# Patient Record
Sex: MALE | Race: OTHER | NOT HISPANIC OR LATINO
[De-identification: names, ages, dates, MRNs, and addresses within clinical notes are randomized per-mention and may not be internally consistent; named-entity substitution may affect disease eponyms.]

---

## 2017-02-22 ENCOUNTER — APPOINTMENT (OUTPATIENT)
Dept: OTOLARYNGOLOGY | Facility: CLINIC | Age: 32
End: 2017-02-22

## 2017-02-22 VITALS
RESPIRATION RATE: 18 BRPM | DIASTOLIC BLOOD PRESSURE: 89 MMHG | SYSTOLIC BLOOD PRESSURE: 127 MMHG | HEART RATE: 82 BPM | BODY MASS INDEX: 34.36 KG/M2 | WEIGHT: 240 LBS | HEIGHT: 70 IN

## 2017-02-22 DIAGNOSIS — Z82.49 FAMILY HISTORY OF ISCHEMIC HEART DISEASE AND OTHER DISEASES OF THE CIRCULATORY SYSTEM: ICD-10-CM

## 2017-02-22 DIAGNOSIS — Z09 ENCOUNTER FOR FOLLOW-UP EXAMINATION AFTER COMPLETED TREATMENT FOR CONDITIONS OTHER THAN MALIGNANT NEOPLASM: ICD-10-CM

## 2017-06-09 ENCOUNTER — OUTPATIENT (OUTPATIENT)
Dept: OUTPATIENT SERVICES | Facility: HOSPITAL | Age: 32
LOS: 1 days | End: 2017-06-09

## 2017-06-09 VITALS
TEMPERATURE: 99 F | DIASTOLIC BLOOD PRESSURE: 80 MMHG | HEART RATE: 98 BPM | OXYGEN SATURATION: 98 % | SYSTOLIC BLOOD PRESSURE: 120 MMHG | RESPIRATION RATE: 16 BRPM | HEIGHT: 70 IN | WEIGHT: 240.08 LBS

## 2017-06-09 DIAGNOSIS — K62.89 OTHER SPECIFIED DISEASES OF ANUS AND RECTUM: ICD-10-CM

## 2017-06-09 DIAGNOSIS — J34.2 DEVIATED NASAL SEPTUM: ICD-10-CM

## 2017-06-09 DIAGNOSIS — J30.2 OTHER SEASONAL ALLERGIC RHINITIS: ICD-10-CM

## 2017-06-09 DIAGNOSIS — G47.33 OBSTRUCTIVE SLEEP APNEA (ADULT) (PEDIATRIC): ICD-10-CM

## 2017-06-09 DIAGNOSIS — G43.909 MIGRAINE, UNSPECIFIED, NOT INTRACTABLE, WITHOUT STATUS MIGRAINOSUS: ICD-10-CM

## 2017-06-09 DIAGNOSIS — J45.909 UNSPECIFIED ASTHMA, UNCOMPLICATED: ICD-10-CM

## 2017-06-09 DIAGNOSIS — J34.3 HYPERTROPHY OF NASAL TURBINATES: ICD-10-CM

## 2017-06-09 LAB
BUN SERPL-MCNC: 15 MG/DL — SIGNIFICANT CHANGE UP (ref 7–23)
CALCIUM SERPL-MCNC: 9.4 MG/DL — SIGNIFICANT CHANGE UP (ref 8.4–10.5)
CHLORIDE SERPL-SCNC: 102 MMOL/L — SIGNIFICANT CHANGE UP (ref 98–107)
CO2 SERPL-SCNC: 24 MMOL/L — SIGNIFICANT CHANGE UP (ref 22–31)
CREAT SERPL-MCNC: 1.14 MG/DL — SIGNIFICANT CHANGE UP (ref 0.5–1.3)
GLUCOSE SERPL-MCNC: 86 MG/DL — SIGNIFICANT CHANGE UP (ref 70–99)
HCT VFR BLD CALC: 44 % — SIGNIFICANT CHANGE UP (ref 39–50)
HGB BLD-MCNC: 14.4 G/DL — SIGNIFICANT CHANGE UP (ref 13–17)
MCHC RBC-ENTMCNC: 26.2 PG — LOW (ref 27–34)
MCHC RBC-ENTMCNC: 32.7 % — SIGNIFICANT CHANGE UP (ref 32–36)
MCV RBC AUTO: 80 FL — SIGNIFICANT CHANGE UP (ref 80–100)
PLATELET # BLD AUTO: 264 K/UL — SIGNIFICANT CHANGE UP (ref 150–400)
PMV BLD: 10.9 FL — SIGNIFICANT CHANGE UP (ref 7–13)
POTASSIUM SERPL-MCNC: 3.7 MMOL/L — SIGNIFICANT CHANGE UP (ref 3.5–5.3)
POTASSIUM SERPL-SCNC: 3.7 MMOL/L — SIGNIFICANT CHANGE UP (ref 3.5–5.3)
RBC # BLD: 5.5 M/UL — SIGNIFICANT CHANGE UP (ref 4.2–5.8)
RBC # FLD: 13.7 % — SIGNIFICANT CHANGE UP (ref 10.3–14.5)
SODIUM SERPL-SCNC: 140 MMOL/L — SIGNIFICANT CHANGE UP (ref 135–145)
WBC # BLD: 8.63 K/UL — SIGNIFICANT CHANGE UP (ref 3.8–10.5)
WBC # FLD AUTO: 8.63 K/UL — SIGNIFICANT CHANGE UP (ref 3.8–10.5)

## 2017-06-09 RX ORDER — SODIUM CHLORIDE 9 MG/ML
1000 INJECTION, SOLUTION INTRAVENOUS
Qty: 0 | Refills: 0 | Status: DISCONTINUED | OUTPATIENT
Start: 2017-06-20 | End: 2017-07-05

## 2017-06-09 NOTE — H&P PST ADULT - NEGATIVE MUSCULOSKELETAL SYMPTOMS
no arthralgia/no arthritis/no myalgia/no muscle weakness/no neck pain/no stiffness/no arm pain R/no joint swelling/no back pain/no muscle cramps/no arm pain L

## 2017-06-09 NOTE — H&P PST ADULT - NEGATIVE GENERAL GENITOURINARY SYMPTOMS
no hematuria/no urine discoloration/no flank pain R/normal libido/no incontinence/no nocturia/no urinary hesitancy/no renal colic/normal urinary frequency/no dysuria/no flank pain L/no bladder infections

## 2017-06-09 NOTE — H&P PST ADULT - PMH
Asthma  Denies any intubations or hospitalizations.  Migraine    SG (obstructive sleep apnea)    Proctitis    Seasonal allergies    Sinusitis

## 2017-06-09 NOTE — H&P PST ADULT - NEGATIVE OPHTHALMOLOGIC SYMPTOMS
no pain L/no loss of vision L/no irritation L/no scleral injection L/no scleral injection R/no photophobia/no discharge R/no discharge L/no diplopia/no blurred vision L/no pain R/no lacrimation L/no lacrimation R/no irritation R/no blurred vision R/no loss of vision R

## 2017-06-09 NOTE — H&P PST ADULT - NEGATIVE NEUROLOGICAL SYMPTOMS
no loss of sensation/no vertigo/no generalized seizures/no loss of consciousness/no focal seizures/no syncope/no paresthesias/no tremors/no weakness/no hemiparesis/no difficulty walking/no facial palsy/no transient paralysis/no confusion

## 2017-06-09 NOTE — H&P PST ADULT - NEGATIVE MALE-SPECIFIC SYMPTOMS
no urethral discharge/no undescended testicle R/no scrotal mass L/no scrotal mass R/no undescended testicle L/no erectile dysfunction/no genital sores/no impotence/no ejaculatory dysfunction

## 2017-06-09 NOTE — H&P PST ADULT - ENT GEN HX ROS MEA POS PC
post-nasal discharge/epistaxis/nasal congestion/dry mouth/sinus symptoms/nasal discharge/nasal obstruction/R/T current condition

## 2017-06-09 NOTE — H&P PST ADULT - ENMT COMMENTS
DX: hypertrophy of nasal turbinates, other chronic diseases of tonsils and adenoids, deviated nasal septum.

## 2017-06-09 NOTE — H&P PST ADULT - PROBLEM SELECTOR PLAN 1
pt is scheduled for a septoplasty, turbinectomy, tonsillectomy on 6/20/17. Preop instructions provided, will take own Pepcid, NPO status discussed and NSAID OTC meds to stop on 6/13/17. Verbalized understanding.

## 2017-06-09 NOTE — H&P PST ADULT - NEGATIVE CARDIOVASCULAR SYMPTOMS
no dyspnea on exertion/no palpitations/no claudication/no paroxysmal nocturnal dyspnea/no peripheral edema/no chest pain/no orthopnea

## 2017-06-09 NOTE — H&P PST ADULT - NEGATIVE GENERAL SYMPTOMS
no sweating/no weight gain/no polyphagia/no fatigue/no anorexia/no weight loss/no polyuria/no chills/no fever/no malaise

## 2017-06-09 NOTE — H&P PST ADULT - NEGATIVE ENMT SYMPTOMS
no tinnitus/no hearing difficulty/no abnormal taste sensation/no vertigo/no dysphagia/no ear pain/no recurrent cold sores/no gum bleeding/no throat pain

## 2017-06-09 NOTE — H&P PST ADULT - HISTORY OF PRESENT ILLNESS
32 y/o male with PMH proctitis, migraines, sinusitis, seasonal allergies and asthma (from allergies) Presents to PST with a preop dx of hypertrophy of nasal turbinates, other chronic diseases of tonsils and adenoids, deviated nasal septum and to be evaluated for a scheduled septoplasty, turbinectomy, tonsillectomy on6/20/17. Pt states has been presenting nasal, sinus, headache issues for many years, cant breath well and sleep well due to condition. Takes meds w/o improvement. Pt f/u with ENT, passed away recently so Dr Saul was recommended to pt. Went for further evaluation and a scope and sleep study was ordered, + SG (no cpap) but surgical intervention was recommended at this time for which is now scheduled. 32 y/o male with PMH proctitis, migraines, sinusitis, seasonal allergies and asthma (from allergies) Presents to PST with a preop dx of hypertrophy of nasal turbinates, other chronic diseases of tonsils and adenoids, deviated nasal septum and to be evaluated for a scheduled septoplasty, turbinectomy, tonsillectomy on6/20/17. Pt states has been presenting nasal, sinus, headache issues for many years, cant breath well and sleep well due to condition. Takes meds w/o improvement. Pt f/u with ENT, specialist passed away so recently Dr Saul was recommended to pt. Went for further evaluation and a scope and sleep study was ordered, + SG (no cpap) but surgical intervention was recommended at this time for which is now scheduled.

## 2017-06-09 NOTE — H&P PST ADULT - NEGATIVE PSYCHIATRIC SYMPTOMS
no depression/no visual hallucinations/no mood swings/no auditory hallucinations/no suicidal ideation/no insomnia/no anxiety/no hyperactivity/no paranoia/no memory loss/no agitation

## 2017-06-09 NOTE — H&P PST ADULT - NEGATIVE GASTROINTESTINAL SYMPTOMS
no melena/no abdominal pain/no constipation/no change in bowel habits/no nausea/no flatulence/no vomiting/no diarrhea

## 2017-06-09 NOTE — H&P PST ADULT - RS GEN PE MLT RESP DETAILS PC
no wheezes/no rhonchi/clear to auscultation bilaterally/respirations non-labored/breath sounds equal/airway patent/no rales/good air movement

## 2017-06-09 NOTE — H&P PST ADULT - FAMILY HISTORY
Father  Still living? Yes, Estimated age: Age Unknown  Family history of heart attack, Age at diagnosis: Age Unknown  Family history of hypertension, Age at diagnosis: Age Unknown     Mother  Still living? Yes, Estimated age: Age Unknown  Family history of heart attack, Age at diagnosis: Age Unknown  Family history of hypertension, Age at diagnosis: Age Unknown     Sibling  Still living? Yes, Estimated age: Age Unknown  Family history of hypertension, Age at diagnosis: Age Unknown

## 2017-06-09 NOTE — H&P PST ADULT - NEGATIVE BREAST SYMPTOMS
no breast lump L/no breast tenderness L/no nipple discharge L/no breast tenderness R/no nipple discharge R/no breast lump R

## 2017-06-20 ENCOUNTER — APPOINTMENT (OUTPATIENT)
Dept: OTOLARYNGOLOGY | Facility: HOSPITAL | Age: 32
End: 2017-06-20

## 2017-06-20 ENCOUNTER — RESULT REVIEW (OUTPATIENT)
Age: 32
End: 2017-06-20

## 2017-06-20 ENCOUNTER — OUTPATIENT (OUTPATIENT)
Dept: OUTPATIENT SERVICES | Facility: HOSPITAL | Age: 32
LOS: 1 days | Discharge: HOME | End: 2017-06-20
Payer: MEDICAID

## 2017-06-20 VITALS
RESPIRATION RATE: 16 BRPM | OXYGEN SATURATION: 98 % | TEMPERATURE: 98 F | WEIGHT: 240.08 LBS | HEART RATE: 69 BPM | SYSTOLIC BLOOD PRESSURE: 139 MMHG | HEIGHT: 70 IN | DIASTOLIC BLOOD PRESSURE: 82 MMHG

## 2017-06-20 DIAGNOSIS — J34.2 DEVIATED NASAL SEPTUM: ICD-10-CM

## 2017-06-20 PROCEDURE — 88311 DECALCIFY TISSUE: CPT | Mod: 26

## 2017-06-20 PROCEDURE — 30130 EXCISE INFERIOR TURBINATE: CPT | Mod: GC

## 2017-06-20 PROCEDURE — 88304 TISSUE EXAM BY PATHOLOGIST: CPT | Mod: 26

## 2017-06-20 PROCEDURE — 30520 REPAIR OF NASAL SEPTUM: CPT | Mod: GC

## 2017-06-20 PROCEDURE — 42826 REMOVAL OF TONSILS: CPT

## 2017-06-20 RX ORDER — OXYCODONE HYDROCHLORIDE 5 MG/1
2 TABLET ORAL
Qty: 56 | Refills: 0 | OUTPATIENT
Start: 2017-06-20 | End: 2017-06-27

## 2017-06-20 RX ORDER — BENZOCAINE AND MENTHOL 5; 1 G/100ML; G/100ML
1 LIQUID ORAL
Qty: 0 | Refills: 0 | COMMUNITY
Start: 2017-06-20

## 2017-06-20 RX ORDER — IBUPROFEN 200 MG
1 TABLET ORAL
Qty: 0 | Refills: 0 | COMMUNITY

## 2017-06-20 RX ORDER — DIPHENHYDRAMINE HCL 50 MG
0 CAPSULE ORAL
Qty: 0 | Refills: 0 | COMMUNITY

## 2017-06-20 RX ORDER — ALBUTEROL 90 UG/1
2 AEROSOL, METERED ORAL
Qty: 0 | Refills: 0 | COMMUNITY

## 2017-06-20 RX ORDER — BENZOCAINE AND MENTHOL 5; 1 G/100ML; G/100ML
1 LIQUID ORAL
Qty: 0 | Refills: 0 | Status: DISCONTINUED | OUTPATIENT
Start: 2017-06-20 | End: 2017-07-05

## 2017-06-20 RX ORDER — MESALAMINE 400 MG
1 TABLET, DELAYED RELEASE (ENTERIC COATED) ORAL
Qty: 0 | Refills: 0 | COMMUNITY

## 2017-06-20 RX ORDER — FEXOFENADINE HCL 30 MG
1 TABLET ORAL
Qty: 0 | Refills: 0 | COMMUNITY

## 2017-06-20 RX ORDER — FLUTICASONE PROPIONATE 50 MCG
0 SPRAY, SUSPENSION NASAL
Qty: 0 | Refills: 0 | COMMUNITY

## 2017-06-20 RX ORDER — ONDANSETRON 8 MG/1
4 TABLET, FILM COATED ORAL ONCE
Qty: 0 | Refills: 0 | Status: DISCONTINUED | OUTPATIENT
Start: 2017-06-20 | End: 2017-06-21

## 2017-06-20 RX ORDER — CEPHALEXIN 500 MG
1 CAPSULE ORAL
Qty: 21 | Refills: 0 | OUTPATIENT
Start: 2017-06-20 | End: 2017-06-27

## 2017-06-20 RX ORDER — HYDROMORPHONE HYDROCHLORIDE 2 MG/ML
1 INJECTION INTRAMUSCULAR; INTRAVENOUS; SUBCUTANEOUS
Qty: 0 | Refills: 0 | Status: DISCONTINUED | OUTPATIENT
Start: 2017-06-20 | End: 2017-06-21

## 2017-06-20 RX ORDER — SUMATRIPTAN SUCCINATE 4 MG/.5ML
0 INJECTION, SOLUTION SUBCUTANEOUS
Qty: 0 | Refills: 0 | COMMUNITY

## 2017-06-20 RX ADMIN — HYDROMORPHONE HYDROCHLORIDE 1 MILLIGRAM(S): 2 INJECTION INTRAMUSCULAR; INTRAVENOUS; SUBCUTANEOUS at 15:10

## 2017-06-20 RX ADMIN — HYDROMORPHONE HYDROCHLORIDE 1 MILLIGRAM(S): 2 INJECTION INTRAMUSCULAR; INTRAVENOUS; SUBCUTANEOUS at 15:25

## 2017-06-20 NOTE — ASU DISCHARGE PLAN (ADULT/PEDIATRIC). - NURSING INSTRUCTIONS
Cool and warm liquids that are not irritating to the throat should be given for the first day or two. Avoid hot liquids. Avoid citrus juices and milk. Advance at your own pace starting with soft foods and advancing to a regular diet. Avoid rough and scratchy foods and foods that are difficult to chew for approximately 5 days. Avoid strenous exercise and blowing of nose.

## 2017-06-20 NOTE — ASU DISCHARGE PLAN (ADULT/PEDIATRIC). - MEDICATION SUMMARY - MEDICATIONS TO TAKE
I will START or STAY ON the medications listed below when I get home from the hospital:    Lialda 1.2 g oral delayed release tablet  -- 1 tab(s) by mouth am  -- Indication: For home    Canasa 1000 mg rectal suppository  -- 1 suppository(ies) rectally once a day (at bedtime)  -- Indication: For home    acetaminophen-oxycodone 325 mg-5 mg oral tablet  -- 2 tab(s) by mouth every 6 hours, As needed, Severe Pain (7 - 10) MDD:8 tab  -- Indication: For pain    Imitrex 25 mg oral tablet  --  by mouth , As Needed  -- Indication: For home    Allegra 180 mg oral tablet  -- 1 tab(s) by mouth once a day  -- Indication: For home    Ventolin HFA 90 mcg/inh inhalation aerosol  -- 2 puff(s) inhaled 4 times a day, As Needed  -- Indication: For home    Keflex 500 mg oral capsule  -- 1 cap(s) by mouth every 8 hours  -- Finish all this medication unless otherwise directed by prescriber.    -- Indication: For antibiotics    Pepcid Complete oral tablet, chewable  -- 1 tab(s) by mouth every 12 hours, As Needed  -- Indication: For home    benzocaine-menthol 15 mg-3.6 mg mucous membrane lozenge  -- 1 lozenge mucous membrane every 3 hours, As Needed  -- Indication: For pain

## 2017-06-20 NOTE — ASU DISCHARGE PLAN (ADULT/PEDIATRIC). - ITEMS TO FOLLOWUP WITH YOUR PHYSICIAN'S
follow up in clinic tomorrow for removal of merocel nasal packing Dinh nasal packing removed in PACU by ENT

## 2017-06-20 NOTE — ASU DISCHARGE PLAN (ADULT/PEDIATRIC). - NOTIFY
Fever greater than 101/Persistent Nausea and Vomiting/Bleeding that does not stop/Inability to Tolerate Liquids or Foods/Pain not relieved by Medications Persistent Nausea and Vomiting/Unable to Urinate/Fever greater than 101/Swelling that continues/Bleeding that does not stop/Inability to Tolerate Liquids or Foods/Pain not relieved by Medications

## 2017-06-20 NOTE — ASU DISCHARGE PLAN (ADULT/PEDIATRIC). - MEDICATION SUMMARY - MEDICATIONS TO STOP TAKING
I will STOP taking the medications listed below when I get home from the hospital:    Flonase 50 mcg/inh nasal spray  --  into nose 2 times a day    Advil Liquigel 200 mg oral capsule  -- 1 cap(s) by mouth every 6 hours, As Needed  last dose 6/13/17    Benadryl 25 mg oral tablet  --  by mouth , As Needed

## 2017-06-20 NOTE — PROGRESS NOTE ADULT - SUBJECTIVE AND OBJECTIVE BOX
Pt. fully recovered from Anesthesia.  No anesthesia complications.  AOX3, VSS.  Transfer care to Otolaryngology.  Pt. to remain in PACU overnight for SG precautions.

## 2017-06-21 ENCOUNTER — OTHER (OUTPATIENT)
Age: 32
End: 2017-06-21

## 2017-06-21 ENCOUNTER — TRANSCRIPTION ENCOUNTER (OUTPATIENT)
Age: 32
End: 2017-06-21

## 2017-06-21 ENCOUNTER — APPOINTMENT (OUTPATIENT)
Dept: OTOLARYNGOLOGY | Facility: CLINIC | Age: 32
End: 2017-06-21

## 2017-06-21 VITALS
HEART RATE: 80 BPM | DIASTOLIC BLOOD PRESSURE: 91 MMHG | TEMPERATURE: 98 F | OXYGEN SATURATION: 99 % | RESPIRATION RATE: 16 BRPM | SYSTOLIC BLOOD PRESSURE: 148 MMHG

## 2017-06-27 LAB — SURGICAL PATHOLOGY STUDY: SIGNIFICANT CHANGE UP

## 2017-06-28 ENCOUNTER — APPOINTMENT (OUTPATIENT)
Dept: OTOLARYNGOLOGY | Facility: CLINIC | Age: 32
End: 2017-06-28

## 2017-09-15 ENCOUNTER — APPOINTMENT (OUTPATIENT)
Dept: OTOLARYNGOLOGY | Facility: CLINIC | Age: 32
End: 2017-09-15
Payer: MEDICAID

## 2017-09-15 VITALS
SYSTOLIC BLOOD PRESSURE: 144 MMHG | WEIGHT: 240 LBS | BODY MASS INDEX: 34.36 KG/M2 | HEIGHT: 70 IN | HEART RATE: 76 BPM | DIASTOLIC BLOOD PRESSURE: 92 MMHG

## 2017-09-15 DIAGNOSIS — R06.89 OTHER ABNORMALITIES OF BREATHING: ICD-10-CM

## 2017-09-15 DIAGNOSIS — J35.8 OTHER CHRONIC DISEASES OF TONSILS AND ADENOIDS: ICD-10-CM

## 2017-09-15 PROCEDURE — 31231 NASAL ENDOSCOPY DX: CPT | Mod: 58,59

## 2017-09-15 PROCEDURE — 99024 POSTOP FOLLOW-UP VISIT: CPT

## 2017-09-15 PROCEDURE — 31575 DIAGNOSTIC LARYNGOSCOPY: CPT | Mod: 58

## 2017-09-15 RX ORDER — OXYCODONE AND ACETAMINOPHEN 5; 325 MG/1; MG/1
5-325 TABLET ORAL
Qty: 14 | Refills: 0 | Status: DISCONTINUED | COMMUNITY
Start: 2017-06-28 | End: 2017-09-15

## 2017-09-22 ENCOUNTER — CLINICAL ADVICE (OUTPATIENT)
Age: 32
End: 2017-09-22

## 2017-10-18 ENCOUNTER — APPOINTMENT (OUTPATIENT)
Dept: OTOLARYNGOLOGY | Facility: CLINIC | Age: 32
End: 2017-10-18

## 2017-11-14 ENCOUNTER — APPOINTMENT (OUTPATIENT)
Dept: SLEEP CENTER | Facility: CLINIC | Age: 32
End: 2017-11-14
Payer: MEDICAID

## 2017-11-14 PROCEDURE — G0399: CPT | Mod: 26

## 2017-11-15 ENCOUNTER — OUTPATIENT (OUTPATIENT)
Dept: OUTPATIENT SERVICES | Facility: HOSPITAL | Age: 32
LOS: 1 days | End: 2017-11-15
Payer: MEDICAID

## 2017-11-15 PROCEDURE — G0399: CPT

## 2017-11-17 ENCOUNTER — RESULT REVIEW (OUTPATIENT)
Age: 32
End: 2017-11-17

## 2017-11-20 DIAGNOSIS — G47.33 OBSTRUCTIVE SLEEP APNEA (ADULT) (PEDIATRIC): ICD-10-CM

## 2017-12-07 ENCOUNTER — RESULT REVIEW (OUTPATIENT)
Age: 32
End: 2017-12-07

## 2017-12-08 ENCOUNTER — RESULT REVIEW (OUTPATIENT)
Age: 32
End: 2017-12-08

## 2018-05-31 ENCOUNTER — APPOINTMENT (OUTPATIENT)
Dept: OTOLARYNGOLOGY | Facility: CLINIC | Age: 33
End: 2018-05-31
Payer: MEDICAID

## 2018-05-31 VITALS
WEIGHT: 235 LBS | HEART RATE: 95 BPM | HEIGHT: 70 IN | BODY MASS INDEX: 33.64 KG/M2 | DIASTOLIC BLOOD PRESSURE: 90 MMHG | SYSTOLIC BLOOD PRESSURE: 134 MMHG

## 2018-05-31 DIAGNOSIS — Z88.9 ALLERGY STATUS TO UNSPECIFIED DRUGS, MEDICAMENTS AND BIOLOGICAL SUBSTANCES: ICD-10-CM

## 2018-05-31 DIAGNOSIS — G47.9 SLEEP DISORDER, UNSPECIFIED: ICD-10-CM

## 2018-05-31 DIAGNOSIS — Z87.19 PERSONAL HISTORY OF OTHER DISEASES OF THE DIGESTIVE SYSTEM: ICD-10-CM

## 2018-05-31 PROCEDURE — 31231 NASAL ENDOSCOPY DX: CPT

## 2018-05-31 PROCEDURE — 99214 OFFICE O/P EST MOD 30 MIN: CPT | Mod: 25

## 2018-07-16 PROBLEM — J45.909 UNSPECIFIED ASTHMA, UNCOMPLICATED: Chronic | Status: ACTIVE | Noted: 2017-06-09

## 2019-09-02 PROBLEM — Z09 FOLLOW UP: Status: ACTIVE | Noted: 2017-02-22

## 2020-01-01 NOTE — H&P PST ADULT - GASTROINTESTINAL DETAILS
Any formula type is fine and since it is so soon I recommend just going ahead and giving it to him when needed as he is only 5 weeks and will likely take anything.     soft/no masses palpable/no organomegaly/no rebound tenderness/nontender/no guarding/no distention/no bruit/no rigidity/bowel sounds normal

## 2020-05-09 NOTE — ASU PREOP CHECKLIST - LATEX ALLERGY
Reason for Call:  Medication or medication refill:    Do you use a [facility]?  Name of the pharmacy and phone number for the current request:  [facility]-please call pharmacy for approval     Name of the medication requested: traMADol (ULTRAM) 50 MG tablet     Other request: patient also states he lost his prescription for an wrist/arm bracelet. Patient would like another prescription for the wrist/arm supplies order DEM.     Can we leave a detailed message on this number? YES    Phone number patient can be reached at: Cell number on file:    Telephone Information:   Mobile [phone]       Best Time: anytime     Call taken on 5/9/2020 at 10:53 AM by Smitha Hendricks       no

## 2022-11-14 ENCOUNTER — RESULT REVIEW (OUTPATIENT)
Age: 37
End: 2022-11-14

## 2023-04-05 PROBLEM — G47.33 OBSTRUCTIVE SLEEP APNEA (ADULT) (PEDIATRIC): Chronic | Status: ACTIVE | Noted: 2017-06-09

## 2023-04-05 PROBLEM — G43.909 MIGRAINE, UNSPECIFIED, NOT INTRACTABLE, WITHOUT STATUS MIGRAINOSUS: Chronic | Status: ACTIVE | Noted: 2017-06-09

## 2023-04-05 PROBLEM — J32.9 CHRONIC SINUSITIS, UNSPECIFIED: Chronic | Status: ACTIVE | Noted: 2017-06-09

## 2023-04-05 PROBLEM — K62.89 OTHER SPECIFIED DISEASES OF ANUS AND RECTUM: Chronic | Status: ACTIVE | Noted: 2017-06-09

## 2023-04-05 PROBLEM — J30.2 OTHER SEASONAL ALLERGIC RHINITIS: Chronic | Status: ACTIVE | Noted: 2017-06-09

## 2023-04-24 ENCOUNTER — APPOINTMENT (OUTPATIENT)
Dept: OTOLARYNGOLOGY | Facility: CLINIC | Age: 38
End: 2023-04-24

## 2023-04-27 ENCOUNTER — NON-APPOINTMENT (OUTPATIENT)
Age: 38
End: 2023-04-27

## 2023-04-27 ENCOUNTER — APPOINTMENT (OUTPATIENT)
Dept: OTOLARYNGOLOGY | Facility: CLINIC | Age: 38
End: 2023-04-27
Payer: MEDICAID

## 2023-04-27 VITALS
SYSTOLIC BLOOD PRESSURE: 150 MMHG | WEIGHT: 260 LBS | HEIGHT: 70 IN | DIASTOLIC BLOOD PRESSURE: 99 MMHG | TEMPERATURE: 97.9 F | BODY MASS INDEX: 37.22 KG/M2 | HEART RATE: 75 BPM

## 2023-04-27 DIAGNOSIS — J31.0 CHRONIC RHINITIS: ICD-10-CM

## 2023-04-27 DIAGNOSIS — Z01.10 ENCOUNTER FOR EXAMINATION OF EARS AND HEARING W/OUT ABNORMAL FINDINGS: ICD-10-CM

## 2023-04-27 DIAGNOSIS — J34.2 DEVIATED NASAL SEPTUM: ICD-10-CM

## 2023-04-27 PROCEDURE — 92567 TYMPANOMETRY: CPT

## 2023-04-27 PROCEDURE — 99203 OFFICE O/P NEW LOW 30 MIN: CPT | Mod: 25

## 2023-04-27 PROCEDURE — 31231 NASAL ENDOSCOPY DX: CPT

## 2023-04-27 PROCEDURE — 92557 COMPREHENSIVE HEARING TEST: CPT

## 2023-04-27 NOTE — PROCEDURE
[FreeTextEntry6] : Anterior Rhinoscopy insufficient to account for symptoms.\par \par After informed verbal consent is obtained, the fiberoptic nasal endoscope #9 is passed via the right nasal cavity.\par The following anatomic sites were directly examined in a sequential fashion:\par The scope was introduced in both  nasal passages between the middle and inferior turbinates to exam the inferior portion of the middle meatus and the fontanelle, as well as the maxillary ostia.  Next, the scope was passed medially and posteriorly to the middle turbinates to examine the sphenoethmoid recess and the superior turbinate region.\par  \par \par   There is [ 0 ]% obstruction of the nasopharynx with adenoid tissue.\par \par NO deviated nasal septum was noted causing obstruction.\par The turbinates were congested-bilateral.\par \par Right Side:\par ·               Mucosa: wnl\par ·               Mucous: none\par ·               Polyp: none\par ·               Inferior Turbinate: sl congested\par ·               Middle Turbinate:sl congested\par ·               Superior Turbinate: wnl\par ·               Inferior Meatus:clear\par ·               Middle Meatus: clear\par ·               Super Meatus: clear\par ·               Sphenoethmoidal Recess: wnl\par \par \par \par Left Side:\par ·               Mucosa: wnl\par ·               Mucous:none\par ·               Polyp: none\par ·               Inferior Turbinate: sl congested\par ·               Middle Turbinate: sl congested\par ·               Superior Turbinate:wnl\par ·               Inferior Meatus: clear\par ·               Middle Meatus: clear\par ·               Super Meatus:clear\par ·               Sphenoethmoidal Recess: wnl\par \par

## 2023-04-27 NOTE — END OF VISIT
[FreeTextEntry3] : I personally saw and examined IRENE VALE in detail. I spoke to ANNABEL Velasquez regarding the assessment and plan of care. I reviewed the above assessment and plan of care, and agree. I have made changes in changes in the body of the note where appropriate.I personally reviewed the HPI, PMH, FH, SH, ROS and medications/allergies. I have spoken to ANNABEL Velasquez regarding the history and have personally determined the assessment and plan of care, and documented this myself. I was present and participated in all key portions of the encounter and all procedures noted above. I have made changes in the body of the note where appropriate.\par \par Attesting Faculty: See Attending Signature Below \par \par \par

## 2023-04-27 NOTE — ASSESSMENT
[FreeTextEntry1] : Mr. VALE 37 year M w/ hx of Septo/turb years ago complains of clogged ears with  muffled hearing s/p flight in February.  Complains of dry mouth x 2 years. Currently sick with a Cold taking Medrol dose pack \par \par Bilateral ETD:\par -Hearing Test performed to evaluate the extent of hearing loss and to explain pt's symptoms-type C tymp Right ear\par \par Dry mouth / GERD/Rhinitis:\par -Antireflux recommendations were given to the patient\par -Maalox and omeprazole prescribed\par -A formal GI evaluation may be needed and was discussed with the patient.\par \par Nasal congestion:\par -Rx: Hypertonic saline \par finish rx of medrol dospak\par poss f/u Dr Saul\par \par f/u prn

## 2023-04-27 NOTE — HISTORY OF PRESENT ILLNESS
[Sinus] : sinus surgery [T & A] : tonsillectomy & adenoidectomy [de-identified] : 36 yo male\par PAtient with hx of septoplasty/turb/tonsil for SG years ago complains of bilateral clogged ears s/p flight in February. His ears popped after the flight but still feels clogged and his hearing is muffled. He also complains that over the past 2 years his has really bad dry mouth, he thought it was from his mouth guard he stopped wearing it. He is currently sick with a cold, taking Medrol dose pack.  \par Chr nasal congestion and mouth breathing\par h/o GERD [Hearing Loss] : no hearing loss [Otalgia] : otalgia [Eustachian Tube Dysfunction] : eustachian tube dysfunction [Cholesteatoma] : no cholesteatoma [Loud Noise Exposure] : no history of loud noise exposure [Early Onset Hearing Loss] : no early onset hearing loss [Headache] : headache [Nasal Congestion] : nasal congestion [Ear Fullness] : ear fullness [Neck Mass] : no neck mass [Cough] : no cough

## 2023-07-05 ENCOUNTER — APPOINTMENT (OUTPATIENT)
Dept: OTOLARYNGOLOGY | Facility: CLINIC | Age: 38
End: 2023-07-05
Payer: MEDICAID

## 2023-07-05 VITALS
DIASTOLIC BLOOD PRESSURE: 109 MMHG | SYSTOLIC BLOOD PRESSURE: 162 MMHG | WEIGHT: 255 LBS | HEART RATE: 80 BPM | HEIGHT: 70 IN | BODY MASS INDEX: 36.51 KG/M2

## 2023-07-05 DIAGNOSIS — H69.83 OTHER SPECIFIED DISORDERS OF EUSTACHIAN TUBE, BILATERAL: ICD-10-CM

## 2023-07-05 DIAGNOSIS — Z87.898 PERSONAL HISTORY OF OTHER SPECIFIED CONDITIONS: ICD-10-CM

## 2023-07-05 DIAGNOSIS — K11.7 DISTURBANCES OF SALIVARY SECRETION: ICD-10-CM

## 2023-07-05 PROCEDURE — 92567 TYMPANOMETRY: CPT

## 2023-07-05 PROCEDURE — 92557 COMPREHENSIVE HEARING TEST: CPT

## 2023-07-05 PROCEDURE — 31231 NASAL ENDOSCOPY DX: CPT

## 2023-07-05 PROCEDURE — 99214 OFFICE O/P EST MOD 30 MIN: CPT | Mod: 25

## 2023-07-05 RX ORDER — GUAIFENESIN 600 MG/1
600 TABLET, EXTENDED RELEASE ORAL
Qty: 120 | Refills: 4 | Status: ACTIVE | COMMUNITY
Start: 2023-07-05 | End: 1900-01-01

## 2023-07-05 RX ORDER — ERENUMAB-AOOE 70 MG/ML
INJECTION SUBCUTANEOUS
Refills: 0 | Status: ACTIVE | COMMUNITY

## 2023-07-05 RX ORDER — SUMATRIPTAN 50 MG/1
TABLET, FILM COATED ORAL
Refills: 0 | Status: ACTIVE | COMMUNITY

## 2023-07-05 RX ORDER — METHYLPREDNISOLONE 4 MG/1
4 TABLET ORAL
Qty: 21 | Refills: 0 | Status: COMPLETED | COMMUNITY
Start: 2018-05-31 | End: 2023-07-05

## 2023-07-05 RX ORDER — RIMEGEPANT SULFATE 75 MG/75MG
TABLET, ORALLY DISINTEGRATING ORAL
Refills: 0 | Status: ACTIVE | COMMUNITY

## 2023-07-05 RX ORDER — MESALAMINE 375 MG/1
0.38 CAPSULE, EXTENDED RELEASE ORAL
Refills: 0 | Status: COMPLETED | COMMUNITY
End: 2023-07-05

## 2023-07-05 RX ORDER — EUCALYPTUS/PEPPERMINT OIL
SOLUTION, NON-ORAL NASAL
Qty: 2 | Refills: 5 | Status: COMPLETED | COMMUNITY
Start: 2017-09-15 | End: 2023-07-05

## 2023-07-05 RX ORDER — FLUTICASONE PROPIONATE 50 UG/1
50 SPRAY, METERED NASAL DAILY
Qty: 1 | Refills: 5 | Status: ACTIVE | COMMUNITY
Start: 2023-07-05 | End: 1900-01-01

## 2023-07-05 RX ORDER — MESALAMINE 1.2 G/1
TABLET, DELAYED RELEASE ORAL
Refills: 0 | Status: ACTIVE | COMMUNITY

## 2023-07-05 NOTE — REASON FOR VISIT
[Initial Evaluation] : an initial evaluation for [FreeTextEntry2] : former patient of Dr. Saul, here for dry mouth

## 2023-07-05 NOTE — PHYSICAL EXAM
[Nasal Endoscopy Performed] : nasal endoscopy was performed, see procedure section for findings [Removed] : palatine tonsils previously removed [Laryngoscopy Performed] : laryngoscopy was performed, see procedure section for findings [Normal] : no rashes [FreeTextEntry1] : dry mouth, popped ears [de-identified] : thick [de-identified] : thick Base of Tongue

## 2023-07-05 NOTE — PROCEDURE
[Image(s) Captured] : image(s) captured and filed [Video Captured] : video captured and filed [Topical Lidocaine] : topical lidocaine [Oxymetazoline HCl] : oxymetazoline HCl [Flexible Endoscope] : examined with the flexible endoscope [Serial Number: ___] : Serial Number: [unfilled] [Recalcitrant Symptoms] : recalcitrant symptoms  [Anterior rhinoscopy insufficient to account for symptoms] : anterior rhinoscopy insufficient to account for symptoms [Normal] : the paranasal sinuses had no abnormalities [FreeTextEntry6] : Pre-op indication(s): dry mouth\par Post-op indication(s): septum midline, eustachian tubes are open, no adenoids\par Verbal consent obtained from patient.\par “Anterior rhinoscopy insufficient to account for symptoms” \par Details for procedure: \par Scope #: 201\par Type of scope:    flexible fiber optic telescope  X   Rigid glass telescope \par Anesthesia and/or vasoconstriction was achieved topically by using: \par 4% Lidocaine spray   0.05% Oxymetazoline     Other ______ \par The following anatomic sites were directly examined in a sequential fashion: \par The scope was introduced in the nasal passage between the middle and inferior turbinates to exam the inferior portion of the middle meatus and the fontanelle, as well as the maxillary ostia. Next, the scope was passed medially and posteriorly to the middle turbinates to examine the sphenoethmoid recess and the superior turbinate region. \par Upon visualization the finders are as follows: \par Nasal Septum:   Normal  \par Bleeding site cauterized:    Anterior   left   right   Posterior   left   right \par Method:   Silver Nitrate   YAG Laser    Electrocautery ______ \par Right Side: \par * Mucosa: Normal\par * Mucous: Normal\par * Polyp: Normal\par * Inferior Turbinate: Normal\par * Middle Turbinate: Normal\par * Superior Turbinate: Normal\par * Inferior Meatus: Normal\par * Middle Meatus: Normal\par * Super Meatus: Normal\par * Sphenoethmoidal Recess: Normal\par Left Side: \par * Mucosa: Normal\par * Mucous: Normal\par * Polyp: Normal\par * Inferior Turbinate: Normal\par * Middle Turbinate: Normal\par * Superior Turbinate: Normal\par * Inferior Meatus: Normal\par * Middle Meatus: Normal\par * Super Meatus: Normal\par * Sphenoethmoidal Recess: Normal\par The patient tolerated the procedure well without any complications.\par \par

## 2023-07-05 NOTE — CONSULT LETTER
[Dear  ___] : Dear  [unfilled], [Courtesy Letter:] : I had the pleasure of seeing your patient, [unfilled], in my office today. [Please see my note below.] : Please see my note below. [Sincerely,] : Sincerely, [FreeTextEntry2] : Sandor Gordon [FreeTextEntry3] : Joselo Saul MD, FRAN, FACS\par  Department Otolaryngology\par Director of [facility]\par Professor of Otolaryngology, \par [employer]\par

## 2023-07-05 NOTE — HISTORY OF PRESENT ILLNESS
[de-identified] : 37 year old male former patient of Dr. Saul, here for dry mouth.  [facility] for the past 3 summers, and last winter has been having a very dry mouth.   [facility] uses a lot of throat lozenges. [facility] has been having a lot of migraine headaches.  States a couple of months ago he flew and his ears bothered him for a couple days after flying.  [facility] saw Dr. Yu and was told it was a sinus issue and he should see Dr. Saul. \par S/p Septoplasty, turbinectomy, and tonsillectomy, subcapsular with coblation 6/20/2017

## 2023-07-11 ENCOUNTER — APPOINTMENT (OUTPATIENT)
Dept: CARDIOLOGY | Facility: CLINIC | Age: 38
End: 2023-07-11
Payer: MEDICAID

## 2023-07-11 ENCOUNTER — NON-APPOINTMENT (OUTPATIENT)
Age: 38
End: 2023-07-11

## 2023-07-11 VITALS
DIASTOLIC BLOOD PRESSURE: 105 MMHG | HEART RATE: 75 BPM | WEIGHT: 255 LBS | HEIGHT: 70 IN | OXYGEN SATURATION: 98 % | SYSTOLIC BLOOD PRESSURE: 158 MMHG | BODY MASS INDEX: 36.51 KG/M2

## 2023-07-11 PROCEDURE — 93000 ELECTROCARDIOGRAM COMPLETE: CPT

## 2023-07-11 PROCEDURE — 99204 OFFICE O/P NEW MOD 45 MIN: CPT | Mod: 25

## 2023-07-13 NOTE — ASSESSMENT
[FreeTextEntry1] : Patient has had progressive hypertension is overweight and is not careful with salt in his diet.  He claims his blood test with Dr. Gordno were in order.  He has no diabetes and is a non-smoker.\par \par Clearly he has significant hypertension which is not controlled and he is on no medication.\par EKG and physical exam are unremarkable except for the hypertension and his abdominal obesity

## 2023-07-13 NOTE — ADDENDUM
[FreeTextEntry1] : Patient called post visit to ask a few questions.  He related that he has a diagnosis of obstructive sleep apnea and uses a device dental device.  He has not been using it for some time.\par \par I did tell him that obstructive sleep apnea and hypertension are very much related\par I suggested a formal evaluation by pulmonary sleep physician of his obstructive sleep apnea and the appropriate therapy which might require CPAP rather than a dental device\par \par The patient has started losartan\par He has a follow-up in 1 month\par I gave him the name of some sleep physicians\par Onur rueda MD

## 2023-07-13 NOTE — REASON FOR VISIT
[FreeTextEntry1] : Ellen Jackson 37 years old here for evaluation of hypertension.  The patient was noted by his internist to be hypertensive and on a recent visit to otolaryngology he again had significant elevations of his systolic and diastolic pressure.

## 2023-07-13 NOTE — PHYSICAL EXAM
[Normal Conjunctiva] : normal conjunctiva [No Carotid Bruit] : no carotid bruit [Normal S1, S2] : normal S1, S2 [Clear Lung Fields] : clear lung fields [Good Air Entry] : good air entry [Soft] : abdomen soft [Non Tender] : non-tender [Normal Gait] : normal gait [No Edema] : no edema [Normal DP B/L] : normal DP B/L [de-identified] : Overweight elevated BMI no acute distress [de-identified] : S1-S2 no murmur even with provocative maneuvers [de-identified] : Protuberant abdomen

## 2023-07-13 NOTE — DISCUSSION/SUMMARY
[EKG obtained to assist in diagnosis and management of assessed problem(s)] : EKG obtained to assist in diagnosis and management of assessed problem(s) [FreeTextEntry1] : 1.  I discussed the importance of weight reduction and salt restriction.  I discussed multiple foods that are high in salt and foods that are caloric\par \par 2.  I suggested that he see a nutritionist and discuss this with his internist\par \par 3.  I am concerned about his blood pressure and I have started him on losartan 50 mg a day.  He will make great efforts to reduce his salt decrease his food intake and begin to exercise\par \par 4.  He will follow-up again in 1 month

## 2023-07-13 NOTE — HISTORY OF PRESENT ILLNESS
[FreeTextEntry1] : Ellen Jackson is 37 years old with a history of significant migraines without an aura on multiple medications including Aimiovig and Nurtec.  He has a history of xerostomia dry mouth.  He also has a history of some inflammatory bowel disease on Lialda\par \par The patient recently has been noted to be hypertensive and his blood pressure seems to be elevating even further.  He is overweight and is not careful with the salt in his diet.  He does not exercise\par \par He has no history of diabetes and is a non-smoker but has a very strong family history of coronary disease and hypertrophic cardiomyopathy\par \par On several visits his blood pressure has risen above 160/100 occasionally up to 180/100.  He denies symptoms of chest pain chest pressure shortness of breath.\par \par He has had no significant hospitalizations\par \par EKG July 11, 2023 normal sinus rhythm within normal limits no evidence of LVH

## 2023-07-20 ENCOUNTER — APPOINTMENT (OUTPATIENT)
Dept: PULMONOLOGY | Facility: CLINIC | Age: 38
End: 2023-07-20
Payer: MEDICAID

## 2023-07-20 PROCEDURE — 99204 OFFICE O/P NEW MOD 45 MIN: CPT | Mod: 95

## 2023-07-20 NOTE — HISTORY OF PRESENT ILLNESS
[Home] : at home, [unfilled] , at the time of the visit. [Medical Office: ([facility])___] : at the medical office located in  [Verbal consent obtained from patient] : the patient, [unfilled] [Frequent Nocturnal Awakening] : frequent nocturnal awakening [Awakes Unrefreshed] : awakening unrefreshed [Awakes with Headache] : headache upon awakening [Awakening With Dry Mouth] : awakening with dry mouth [Recent  Weight Gain] : recent weight gain [Daytime Somnolence] : daytime somnolence [To Bed: ___] : ~he/she~ goes to bed at [unfilled] [Arises: ___] : arises at [unfilled] [Sleep Onset Latency: ___ minutes] : sleep onset latency of [unfilled] minutes reported [Nocturnal Awakenings: ___] : ~he/she~ typically has [unfilled] nocturnal awakenings [WASO: ___] : Wake time after sleep onset is [unfilled] [TST: ___] : Total sleep time is [unfilled] [Daytime Sleep: ___] : daytime sleep: [unfilled] [FreeTextEntry1] : 37 year old male here for evaluation and management of obstructive sleep apnea.\par \par Patient has a history of septoplasty, turbinectomy, tonsillectomy (6/20/2017)\par \par Patient reports a longstanding history of migraines and obstructive sleep apnea.  Recently his migraines have gotten worse and also was diagnosed with hypertension.  He is concerned that this may be related to suboptimally treated obstructive sleep apnea so he he is here for further management.\par \par PSG (12/2/2007) AHI 1.2/hr\par AIDA HST (5/23/2013) 5/hr\par AIDA HST (11/24 - 11/27/2015) 5/hr T90 1.6%\par HST (9/27/2016) AHI 1.4/hr, T90 0.1%\par HST (11/15/2017) with oral appliance CARITO 2.6/hr, T90 0.0%\par HST (11/16/2017) without oral appliance CARITO 4.6/hr, T90 0.3%\par \par Patient has never tried CPAP before.  He was advised advised against CPAP therapy from his prior physicians given his mild severity.  On his latest HST performed in 2017 without the oral appliance flow limited breathing was observed throughout the study.  He is increasingly symptomatic and has gained about 15 pounds since the last sleep study.  Other sleep-related symptoms and sleep schedule are as listed below. [Snoring] : no snoring [Witnessed Apneas] : no witnessed sleep apnea [Unintentional Sleep while Active] : no unintentional sleep while active [Unintentional Sleep While Inactive] : no unintentional sleep while inactive [ESS] : 3

## 2023-07-20 NOTE — ASSESSMENT
[FreeTextEntry1] : This is a 37 year old male here for further management of obstructive sleep apnea. The patient has a history of mild sleep apnea he diagnosed on home sleep studies.  Since the latest sleep study which showed a normal CARITO, he has gained a significant amount of weight and has worsening signs and symptoms of sleep-disordered breathing. He was referred to the [facility] for a 2 night diagnostic HSAT with and without oral appliance.  He was advised to avoid using the dental device for 2 nights leading up to the sleep study the ramifications of SG and its potential therapeutic modalities were discussed with the patient.  He is willing to consider CPAP if he is found to have obstructive sleep apnea.  He will follow up with us after the HSAT.

## 2023-07-21 ENCOUNTER — APPOINTMENT (OUTPATIENT)
Dept: OTOLARYNGOLOGY | Facility: CLINIC | Age: 38
End: 2023-07-21

## 2023-07-30 NOTE — PATIENT PROFILE ADULT. - PRO SERVICES AT [facility]
For information on Fall & Injury Prevention, visit: [email or web address] OR  [email or web address] OR  https://www.cdc.gov/steadi/patient.html none

## 2023-08-15 ENCOUNTER — TRANSCRIPTION ENCOUNTER (OUTPATIENT)
Age: 38
End: 2023-08-15

## 2023-08-16 ENCOUNTER — OUTPATIENT (OUTPATIENT)
Dept: OUTPATIENT SERVICES | Facility: HOSPITAL | Age: 38
LOS: 1 days | End: 2023-08-16
Payer: MEDICAID

## 2023-08-16 ENCOUNTER — APPOINTMENT (OUTPATIENT)
Dept: SLEEP CENTER | Facility: CLINIC | Age: 38
End: 2023-08-16
Payer: MEDICAID

## 2023-08-16 ENCOUNTER — APPOINTMENT (OUTPATIENT)
Dept: CARDIOLOGY | Facility: CLINIC | Age: 38
End: 2023-08-16
Payer: MEDICAID

## 2023-08-16 VITALS
HEART RATE: 105 BPM | BODY MASS INDEX: 36.02 KG/M2 | DIASTOLIC BLOOD PRESSURE: 93 MMHG | WEIGHT: 251 LBS | SYSTOLIC BLOOD PRESSURE: 146 MMHG | OXYGEN SATURATION: 99 %

## 2023-08-16 DIAGNOSIS — G43.909 MIGRAINE, UNSPECIFIED, NOT INTRACTABLE, W/OUT STATUS MIGRAINOSUS: ICD-10-CM

## 2023-08-16 PROCEDURE — 95800 SLP STDY UNATTENDED: CPT | Mod: 26

## 2023-08-16 PROCEDURE — 99213 OFFICE O/P EST LOW 20 MIN: CPT

## 2023-08-16 PROCEDURE — 95800 SLP STDY UNATTENDED: CPT

## 2023-08-16 NOTE — ASSESSMENT
[FreeTextEntry1] : Patient has had progressive hypertension is overweight and is not careful with salt in his diet.  He claims his blood test with Dr. Gordon were in order.  He has no diabetes and is a non-smoker.  Clearly he has significant hypertension which is not controlled and he is on no medication. EKG and physical exam are unremarkable except for the hypertension and his abdominal obesity  Patient on losartan 50 mg a day has improvement in his blood pressure as well as his decreasing his salt.  He is also lost a few pounds but is not on a clear-cut diet  He is being reevaluated for obstructive sleep apnea

## 2023-08-16 NOTE — DISCUSSION/SUMMARY
[FreeTextEntry1] : 1.  Discussed the importance of continuing with a low-salt, seeing a nutritionist for better control of his weight. 2.  I am increasing his losartan to 75 mg a day 50 at night and 20 5 in the morning 3.  Follow-up again in 2 months, 2D echo at that visit  Prescription sent to [facility] pharmacy

## 2023-08-16 NOTE — PHYSICAL EXAM
HPI:  75 yo M with no pertinent medical history (but without any physician visits for years), initially brought into [facility] ED with dyspnea with flu-like symptoms that had been worsening for 5 days. He was found to be in hypercapneic respiratory failure and was intubated in the ED after having persistent dyspnea and increasing work of breathing even after a short trial of BiPAP. He was admitted to the MICU where he was continued on brochodilators and systemic steroids. RVP was positive for RVP, which was attributed as the trigger for his respiratory distress. He has been going through CPAP trials daily without much improvement of his lung mechanics and we are consulted for his possible obstructive lung disease exacerbation management.    All other components of the 10 point review of systems is negative aside from those mentioned above.    PAST MEDICAL & SURGICAL HISTORY:  No pertinent past medical history  No significant past surgical history    Smoking history:  [ ] Current [X ] Former [ ] Never      VITAL SIGNS:  ICU Vital Signs Last 24 Hrs  T(C): 37.3 (13 Jan 2020 13:00), Max: 37.4 (13 Jan 2020 09:19)  T(F): 99.2 (13 Jan 2020 13:00), Max: 99.3 (13 Jan 2020 09:19)  HR: 66 (13 Jan 2020 13:00) (46 - 102)  BP: 109/70 (13 Jan 2020 13:00) (90/50 - 198/74)  BP(mean): 95 (13 Jan 2020 13:00) (59 - 118)  ABP: --  ABP(mean): --  RR: 24 (13 Jan 2020 13:00) (11 - 33)  SpO2: 100% (13 Jan 2020 13:00) (79% - 100%)    Mode: AC/ CMV (Assist Control/ Continuous Mandatory Ventilation), RR (machine): 12, TV (machine): 400, FiO2: 40, PEEP: 5, ITime: 0.8, MAP: 10, PIP: 39    01-12 @ 07:01  -  01-13 @ 07:00  --------------------------------------------------------  IN: 2232 mL / OUT: 1355 mL / NET: 877 mL    01-13 @ 07:01 - 01-13 @ 14:19  --------------------------------------------------------  IN: 563.9 mL / OUT: 225 mL / NET: 338.9 mL      CAPILLARY BLOOD GLUCOSE      POCT Blood Glucose.: 113 mg/dL (13 Jan 2020 12:07)      PHYSICAL EXAM:  Constitutional: Intubated and sedated  HEENT: NC/AT; PERRL, anicteric sclera; no oropharyngeal erythema or exudates; slightly dry mucus membranes  Neck: supple, no JVD  Respiratory:    Cardiovascular: +S1/S2, RRR  Gastrointestinal: abdomen soft, NT/ND; +BS x4  Extremities: WWP; no clubbing, cyanosis or edema  Vascular: 2+ radial, DP/PT and femoral pulses B/L      MEDICATIONS:  MEDICATIONS  (STANDING):  albuterol/ipratropium for Nebulization 3 milliLiter(s) Nebulizer every 4 hours  azithromycin  IVPB 500 milliGRAM(s) IV Intermittent every 24 hours  chlorhexidine 0.12% Liquid 15 milliLiter(s) Oral Mucosa every 12 hours  chlorhexidine 2% Cloths 1 Application(s) Topical daily  dexMEDEtomidine Infusion 0.2 MICROgram(s)/kG/Hr (2.5 mL/Hr) IV Continuous <Continuous>  dextrose 5%. 1000 milliLiter(s) (50 mL/Hr) IV Continuous <Continuous>  dextrose 50% Injectable 12.5 Gram(s) IV Push once  dextrose 50% Injectable 25 Gram(s) IV Push once  dextrose 50% Injectable 25 Gram(s) IV Push once  enoxaparin Injectable 30 milliGRAM(s) SubCutaneous every 24 hours  insulin lispro (HumaLOG) corrective regimen sliding scale   SubCutaneous every 6 hours  methylPREDNISolone sodium succinate Injectable 40 milliGRAM(s) IV Push daily  metoclopramide 5 milliGRAM(s) Oral every 8 hours  pantoprazole  Injectable 40 milliGRAM(s) IV Push daily  petrolatum Ophthalmic Ointment 1 Application(s) Both EYES three times a day  polyethylene glycol 3350 17 Gram(s) Oral daily  propofol Infusion 5 MICROgram(s)/kG/Min (1.5 mL/Hr) IV Continuous <Continuous>    MEDICATIONS  (PRN):  acetaminophen  Suppository .. 650 milliGRAM(s) Rectal every 6 hours PRN Temp greater or equal to 38C (100.4F)  dextrose 40% Gel 15 Gram(s) Oral once PRN Blood Glucose LESS THAN 70 milliGRAM(s)/deciliter  glucagon  Injectable 1 milliGRAM(s) IntraMuscular once PRN Glucose LESS THAN 70 milligrams/deciliter      ALLERGIES:  Allergies    No Known Allergies    Intolerances        LABS:                        10.6   7.40  )-----------( 157      ( 13 Jan 2020 06:34 )             33.9     01-13    142  |  100  |  18  ----------------------------<  95  3.9   |  32<H>  |  0.85    Ca    8.5      13 Jan 2020 06:34  Phos  2.9     01-13  Mg     2.6     01-13 [Normal Conjunctiva] : normal conjunctiva [No Carotid Bruit] : no carotid bruit [Normal S1, S2] : normal S1, S2 [Clear Lung Fields] : clear lung fields [Good Air Entry] : good air entry [Soft] : abdomen soft [Non Tender] : non-tender [No Edema] : no edema [Normal DP B/L] : normal DP B/L [de-identified] : Overweight elevated BMI no acute distress [de-identified] : S1-S2 no murmur even with provocative maneuvers [de-identified] : Protuberant abdomen

## 2023-08-16 NOTE — HISTORY OF PRESENT ILLNESS
[FreeTextEntry1] : Ellen Jackson is 37 years old with a history of significant migraines without an aura on multiple medications including Aimiovig and Nurtec.  He has a history of xerostomia dry mouth.  He also has a history of some inflammatory bowel disease on Lialda  The patient recently has been noted to be hypertensive and his blood pressure seems to be elevating even further.  He is overweight and is not careful with the salt in his diet.  He does not exercise  He has no history of diabetes and is a non-smoker but has a very strong family history of coronary disease and hypertrophic cardiomyopathy  On several visits his blood pressure has risen above 160/100 occasionally up to 180/100.  He denies symptoms of chest pain chest pressure shortness of breath.  He has had no significant hospitalizations  EKG July 11, 2023 normal sinus rhythm within normal limits no evidence of LVH  Visit August 16, 2023: I placed the patient on losartan 50 mg a day on last visit.  He has decreased his salt since last visit and has lost about 3 pounds though is not on any specific diet and has not seen a nutritionist.  Today he has a headache/migraine.  He is being evaluated by obstructive sleep apnea and is to have testing off his device and on the device which is a dental device.

## 2023-08-16 NOTE — REASON FOR VISIT
[FreeTextEntry1] : Ellen Jackson 37 years old here for follow-up of his hypertension and elevated BMI.  I last saw him 1 month ago

## 2023-08-24 DIAGNOSIS — G47.33 OBSTRUCTIVE SLEEP APNEA (ADULT) (PEDIATRIC): ICD-10-CM

## 2023-08-25 ENCOUNTER — NON-APPOINTMENT (OUTPATIENT)
Age: 38
End: 2023-08-25

## 2023-09-05 ENCOUNTER — APPOINTMENT (OUTPATIENT)
Dept: PULMONOLOGY | Facility: CLINIC | Age: 38
End: 2023-09-05

## 2023-09-26 ENCOUNTER — APPOINTMENT (OUTPATIENT)
Dept: PULMONOLOGY | Facility: CLINIC | Age: 38
End: 2023-09-26
Payer: MEDICAID

## 2023-09-26 PROCEDURE — 99442: CPT

## 2023-10-18 ENCOUNTER — OUTPATIENT (OUTPATIENT)
Dept: OUTPATIENT SERVICES | Facility: HOSPITAL | Age: 38
LOS: 1 days | End: 2023-10-18
Payer: MEDICAID

## 2023-10-18 ENCOUNTER — APPOINTMENT (OUTPATIENT)
Dept: SLEEP CENTER | Facility: CLINIC | Age: 38
End: 2023-10-18
Payer: MEDICAID

## 2023-10-18 PROCEDURE — 95810 POLYSOM 6/> YRS 4/> PARAM: CPT | Mod: 26

## 2023-10-18 PROCEDURE — 95810 POLYSOM 6/> YRS 4/> PARAM: CPT

## 2023-10-19 ENCOUNTER — APPOINTMENT (OUTPATIENT)
Age: 38
End: 2023-10-19

## 2023-10-19 ENCOUNTER — APPOINTMENT (OUTPATIENT)
Age: 38
End: 2023-10-19
Payer: MEDICAID

## 2023-10-19 PROCEDURE — 99203 OFFICE O/P NEW LOW 30 MIN: CPT

## 2023-10-25 ENCOUNTER — APPOINTMENT (OUTPATIENT)
Dept: CARDIOLOGY | Facility: CLINIC | Age: 38
End: 2023-10-25
Payer: MEDICAID

## 2023-10-25 VITALS
OXYGEN SATURATION: 99 % | BODY MASS INDEX: 36.3 KG/M2 | DIASTOLIC BLOOD PRESSURE: 82 MMHG | HEART RATE: 68 BPM | WEIGHT: 253 LBS | SYSTOLIC BLOOD PRESSURE: 118 MMHG

## 2023-10-25 DIAGNOSIS — G47.33 OBSTRUCTIVE SLEEP APNEA (ADULT) (PEDIATRIC): ICD-10-CM

## 2023-10-25 PROCEDURE — 93306 TTE W/DOPPLER COMPLETE: CPT

## 2023-10-25 PROCEDURE — 99214 OFFICE O/P EST MOD 30 MIN: CPT

## 2023-10-25 RX ORDER — LOSARTAN POTASSIUM 50 MG/1
50 TABLET, FILM COATED ORAL DAILY
Qty: 90 | Refills: 0 | Status: COMPLETED | COMMUNITY
Start: 2023-07-11 | End: 2023-10-25

## 2023-11-14 ENCOUNTER — APPOINTMENT (OUTPATIENT)
Dept: PULMONOLOGY | Facility: CLINIC | Age: 38
End: 2023-11-14
Payer: MEDICAID

## 2023-11-14 PROCEDURE — 99442: CPT

## 2023-12-09 ENCOUNTER — OUTPATIENT (OUTPATIENT)
Dept: OUTPATIENT SERVICES | Facility: HOSPITAL | Age: 38
LOS: 1 days | End: 2023-12-09
Payer: MEDICAID

## 2023-12-09 ENCOUNTER — APPOINTMENT (OUTPATIENT)
Dept: SLEEP CENTER | Facility: CLINIC | Age: 38
End: 2023-12-09
Payer: MEDICAID

## 2023-12-09 PROCEDURE — 95811 POLYSOM 6/>YRS CPAP 4/> PARM: CPT

## 2023-12-09 PROCEDURE — 95811 POLYSOM 6/>YRS CPAP 4/> PARM: CPT | Mod: 26

## 2023-12-20 DIAGNOSIS — G47.33 OBSTRUCTIVE SLEEP APNEA (ADULT) (PEDIATRIC): ICD-10-CM

## 2023-12-22 ENCOUNTER — APPOINTMENT (OUTPATIENT)
Dept: PULMONOLOGY | Facility: CLINIC | Age: 38
End: 2023-12-22
Payer: MEDICAID

## 2023-12-22 PROCEDURE — 99442: CPT

## 2024-01-29 ENCOUNTER — RX RENEWAL (OUTPATIENT)
Age: 39
End: 2024-01-29

## 2024-02-07 ENCOUNTER — NON-APPOINTMENT (OUTPATIENT)
Age: 39
End: 2024-02-07

## 2024-02-07 ENCOUNTER — APPOINTMENT (OUTPATIENT)
Dept: CARDIOLOGY | Facility: CLINIC | Age: 39
End: 2024-02-07
Payer: MEDICAID

## 2024-02-07 VITALS
WEIGHT: 260 LBS | OXYGEN SATURATION: 97 % | HEART RATE: 70 BPM | BODY MASS INDEX: 37.31 KG/M2 | DIASTOLIC BLOOD PRESSURE: 87 MMHG | SYSTOLIC BLOOD PRESSURE: 133 MMHG

## 2024-02-07 DIAGNOSIS — G47.33 OBSTRUCTIVE SLEEP APNEA (ADULT) (PEDIATRIC): ICD-10-CM

## 2024-02-07 DIAGNOSIS — I10 ESSENTIAL (PRIMARY) HYPERTENSION: ICD-10-CM

## 2024-02-07 DIAGNOSIS — K21.9 GASTRO-ESOPHAGEAL REFLUX DISEASE W/OUT ESOPHAGITIS: ICD-10-CM

## 2024-02-07 PROCEDURE — 99214 OFFICE O/P EST MOD 30 MIN: CPT

## 2024-02-07 NOTE — PHYSICAL EXAM
[Normal Conjunctiva] : normal conjunctiva [No Carotid Bruit] : no carotid bruit [Normal S1, S2] : normal S1, S2 [Clear Lung Fields] : clear lung fields [Good Air Entry] : good air entry [Soft] : abdomen soft [Non Tender] : non-tender [No Edema] : no edema [Normal DP B/L] : normal DP B/L [de-identified] : Overweight elevated BMI no acute distress [de-identified] : S1-S2 no murmur even with provocative maneuvers [de-identified] : Protuberant abdomen

## 2024-02-07 NOTE — HISTORY OF PRESENT ILLNESS
[FreeTextEntry1] : Ellen Jackson is 37 years old with a history of significant migraines without an aura on multiple medications including Aimiovig and Nurtec.  He has a history of xerostomia dry mouth.  He also has a history of some inflammatory bowel disease on Lialda  The patient recently has been noted to be hypertensive and his blood pressure seems to be elevating even further.  He is overweight and is not careful with the salt in his diet.  He does not exercise  He has no history of diabetes and is a non-smoker but has a very strong family history of coronary disease and hypertrophic cardiomyopathy  On several visits his blood pressure has risen above 160/100 occasionally up to 180/100.  He denies symptoms of chest pain chest pressure shortness of breath.  He has had no significant hospitalizations  EKG July 11, 2023 normal sinus rhythm within normal limits no evidence of LVH  Visit August 16, 2023: I placed the patient on losartan 50 mg a day on last visit.  He has decreased his salt since last visit and has lost about 3 pounds though is not on any specific diet and has not seen a nutritionist.  Today he has a headache/migraine.  He is being evaluated by obstructive sleep apnea and is to have testing off his device and on the device which is a dental device.  Visit 10/25/2023: Patient had a 2D echo today.  2D echo preliminarily reveals normal LV and RV function no evidence of valvular disease no clear evidence of LVH full report to follow The patient has been more careful with salt in his diet but has not lost weight.  He is on losartan 50 mg a day.  He still gets some morning headaches and is being further evaluated by sleep and had a formal sleep study to rule out CO2 retention at night report is not available  He does not exercise and again remains overweight Blood pressure initially was 118/80 but later with a large cuff pressure was 135/90  Visit February 7, 2024: The patient has been diagnosed with obstructive sleep apnea with periods of hypoventilation with elevation of CO2 Unfortunately he has gained 7 pounds since the last visit rather than losing.  Overall he feels well without chest pain chest pressure shortness of breath.  He has been diagnosed with sleep apnea some form with some CO2 retention hypoventilation.  He is now on a trial of CPAP having difficulty using it.  He still has recurrent headaches which do respond to his present medication.

## 2024-02-07 NOTE — ASSESSMENT
[FreeTextEntry1] : 1.  Essential hypertension-better controlled on losartan 50 at night and 25 in the morning  2.  Elevated BMI needs weight reduction exercise.  He has gained 7 pounds since last visit though he claims not to have increased his caloric intake  3.  Obstructive sleep apnea  4.  Migraine

## 2024-02-07 NOTE — REASON FOR VISIT
[FreeTextEntry1] : Ellen Jackson 38 years old with hypertension elevated BMI and obstructive sleep apnea here for follow-up of his hypertension

## 2024-02-07 NOTE — DISCUSSION/SUMMARY
[FreeTextEntry1] : 1.  Discussed the importance of continuing with a low-salt, seeing a nutritionist for better control of his weight. 2.  Continue losartan 50 mg a.m. 25 mg p.m. 3.  Patient needs to be patient with CPAP  Follow-up 4 months

## 2024-02-12 ENCOUNTER — RX RENEWAL (OUTPATIENT)
Age: 39
End: 2024-02-12

## 2024-03-28 ENCOUNTER — APPOINTMENT (OUTPATIENT)
Dept: PULMONOLOGY | Facility: CLINIC | Age: 39
End: 2024-03-28
Payer: MEDICAID

## 2024-03-28 DIAGNOSIS — E66.2 MORBID (SEVERE) OBESITY WITH ALVEOLAR HYPOVENTILATION: ICD-10-CM

## 2024-03-28 PROCEDURE — 99214 OFFICE O/P EST MOD 30 MIN: CPT

## 2024-03-28 NOTE — ASSESSMENT
[FreeTextEntry1] : 37-year-old male with nocturnal hypoventilation on bilevel here for a follow up visit after receiving device. Therapeutic and compliance data download reveals usage 80% of days with an average use of 5 hours and 25 minutes. Therapy based AHI is 0.6/hr on 14/7 cm H2O. he is not yet noticing improvement in symptoms as the mask is uncomfortable.  Patient to schedule mask fitting.  Follow-up in 3 to 4 months, sooner if needed.

## 2024-03-28 NOTE — HISTORY OF PRESENT ILLNESS
[Home] : at home, [unfilled] , at the time of the visit. [Medical Office: ([facility])___] : at the medical office located in  [Verbal consent obtained from patient] : the patient, [unfilled] [Frequent Nocturnal Awakening] : frequent nocturnal awakening [Awakes Unrefreshed] : awakening unrefreshed [Awakes with Headache] : headache upon awakening [Awakening With Dry Mouth] : awakening with dry mouth [Daytime Somnolence] : daytime somnolence [Recent  Weight Gain] : recent weight gain [To Bed: ___] : ~he/she~ goes to bed at [unfilled] [Arises: ___] : arises at [unfilled] [Sleep Onset Latency: ___ minutes] : sleep onset latency of [unfilled] minutes reported [Nocturnal Awakenings: ___] : ~he/she~ typically has [unfilled] nocturnal awakenings [TST: ___] : Total sleep time is [unfilled] [WASO: ___] : Wake time after sleep onset is [unfilled] [Daytime Sleep: ___] : daytime sleep: [unfilled] [FreeTextEntry1] : 38-year-old male with nocturnal hypoventilation on bilevel here for a follow up visit after receiving device.   Patient has a history of septoplasty, turbinectomy, tonsillectomy (6/20/2017)  Patient reports a longstanding history of migraines and obstructive sleep apnea.  Recently his migraines have gotten worse and also was diagnosed with hypertension.  He is concerned that this may be related to suboptimally treated obstructive sleep apnea so he he is here for further management.  PSG (12/2/2007) AHI 1.2/hr AIDA HST (5/23/2013) 5/hr AIDA HST (11/24 - 11/27/2015) 5/hr T90 1.6% HST (9/27/2016) AHI 1.4/hr, T90 0.1% HST (11/15/2017) with oral appliance CARITO 2.6/hr, T90 0.0% HST (11/16/2017) without oral appliance CARITO 4.6/hr, T90 0.3% PSG (10/18/2023) AHI 0.7/hr, T90 0%, TCO2 levels up to 52 mmHg  Bilevel titration (12/9/2023) optimal pressure of 14/7 cm H2O   Device: AirCurve 10 VAuto (1/15/2024) Setting: Bilevel 14/7 cm H2O DME: [facility]  Received the device about 2 months prior.  He is getting more accustomed to using the device but states that the mask is still uncomfortable, and he often finds himself pulling it off during the night unknowingly.  He has not felt significant improvement in his migraines, but he states that it may be due to the fact that he does not use it for the duration of the night. [Snoring] : no snoring [Witnessed Apneas] : no witnessed sleep apnea [Unintentional Sleep while Active] : no unintentional sleep while active [Unintentional Sleep While Inactive] : no unintentional sleep while inactive [ESS] : 3

## 2024-04-09 ENCOUNTER — RX RENEWAL (OUTPATIENT)
Age: 39
End: 2024-04-09

## 2024-04-15 ENCOUNTER — APPOINTMENT (OUTPATIENT)
Dept: PULMONOLOGY | Facility: CLINIC | Age: 39
End: 2024-04-15
Payer: MEDICAID

## 2024-04-15 PROCEDURE — 94660 CPAP INITIATION&MGMT: CPT

## 2024-04-16 NOTE — PROCEDURE
[FreeTextEntry1] : Maskfitting Patient is currently using a ResMed N20 mask, size Medium and a P10 nasal pillows mask, size small. Patient opens his mouth while using the masks.   Patient was fit with a ResMed Airfit F30i full face mask, size Medium.  LAURASHANNONLINDA VALE would prefer not to use a full face mask. Patient is having issues with his home care company and is not getting his supplies in a timely manner according to the chart provided to him by his DME company, [employer].

## 2024-06-05 ENCOUNTER — APPOINTMENT (OUTPATIENT)
Dept: CARDIOLOGY | Facility: CLINIC | Age: 39
End: 2024-06-05
Payer: MEDICAID

## 2024-06-05 VITALS
OXYGEN SATURATION: 97 % | SYSTOLIC BLOOD PRESSURE: 120 MMHG | DIASTOLIC BLOOD PRESSURE: 90 MMHG | BODY MASS INDEX: 37.37 KG/M2 | HEIGHT: 70 IN | WEIGHT: 261 LBS | HEART RATE: 74 BPM

## 2024-06-05 DIAGNOSIS — E66.2 MORBID (SEVERE) OBESITY WITH ALVEOLAR HYPOVENTILATION: ICD-10-CM

## 2024-06-05 DIAGNOSIS — G47.33 OBSTRUCTIVE SLEEP APNEA (ADULT) (PEDIATRIC): ICD-10-CM

## 2024-06-05 PROCEDURE — 93000 ELECTROCARDIOGRAM COMPLETE: CPT

## 2024-06-05 PROCEDURE — G2211 COMPLEX E/M VISIT ADD ON: CPT | Mod: NC,1L

## 2024-06-05 PROCEDURE — 99214 OFFICE O/P EST MOD 30 MIN: CPT | Mod: 25

## 2024-06-05 RX ORDER — OMEPRAZOLE MAGNESIUM 40 MG/1
40 CAPSULE, DELAYED RELEASE ORAL
Refills: 0 | Status: ACTIVE | COMMUNITY

## 2024-06-05 RX ORDER — DEXLANSOPRAZOLE 60 MG/1
CAPSULE, DELAYED RELEASE ORAL
Refills: 0 | Status: ACTIVE | COMMUNITY

## 2024-06-05 NOTE — ASSESSMENT
[FreeTextEntry1] : 1.  Essential hypertension-better controlled on losartan 50 at night and 25 in the morning  2.  Elevated BMI needs weight reduction exercise.  He has gained 7 pounds since last visit though he claims not to have increased his caloric intake.  Patient has not lost weight  3.  Obstructive sleep apnea/CO2 retention on BiPAP  4.  Migraine perhaps worsened by CO2 retention  Patient needs weight reduction which will help his sleep apnea and CO2 retention.  His blood pressure is

## 2024-06-05 NOTE — PHYSICAL EXAM
[Normal Conjunctiva] : normal conjunctiva [No Carotid Bruit] : no carotid bruit [Normal S1, S2] : normal S1, S2 [Clear Lung Fields] : clear lung fields [Good Air Entry] : good air entry [Soft] : abdomen soft [Non Tender] : non-tender [No Edema] : no edema [Normal DP B/L] : normal DP B/L [de-identified] : Overweight elevated BMI no acute distress [de-identified] : S1-S2 no murmur even with provocative maneuvers [de-identified] : Protuberant abdomen

## 2024-06-05 NOTE — PHYSICAL EXAM
[Normal Conjunctiva] : normal conjunctiva [No Carotid Bruit] : no carotid bruit [Normal S1, S2] : normal S1, S2 [Clear Lung Fields] : clear lung fields [Good Air Entry] : good air entry [Soft] : abdomen soft [Non Tender] : non-tender [No Edema] : no edema [Normal DP B/L] : normal DP B/L [de-identified] : Overweight elevated BMI no acute distress [de-identified] : S1-S2 no murmur even with provocative maneuvers [de-identified] : Protuberant abdomen

## 2024-06-05 NOTE — HISTORY OF PRESENT ILLNESS
[FreeTextEntry1] : Ellen Jackson is 37 years old with a history of significant migraines without an aura on multiple medications including Aimiovig and Nurtec.  He has a history of xerostomia dry mouth.  He also has a history of some inflammatory bowel disease on Lialda  The patient recently has been noted to be hypertensive and his blood pressure seems to be elevating even further.  He is overweight and is not careful with the salt in his diet.  He does not exercise  He has no history of diabetes and is a non-smoker but has a very strong family history of coronary disease and hypertrophic cardiomyopathy  On several visits his blood pressure has risen above 160/100 occasionally up to 180/100.  He denies symptoms of chest pain chest pressure shortness of breath.  He has had no significant hospitalizations  EKG July 11, 2023 normal sinus rhythm within normal limits no evidence of LVH  Visit August 16, 2023: I placed the patient on losartan 50 mg a day on last visit.  He has decreased his salt since last visit and has lost about 3 pounds though is not on any specific diet and has not seen a nutritionist.  Today he has a headache/migraine.  He is being evaluated by obstructive sleep apnea and is to have testing off his device and on the device which is a dental device.  Visit 10/25/2023: Patient had a 2D echo today.  2D echo preliminarily reveals normal LV and RV function no evidence of valvular disease no clear evidence of LVH full report to follow The patient has been more careful with salt in his diet but has not lost weight.  He is on losartan 50 mg a day.  He still gets some morning headaches and is being further evaluated by sleep and had a formal sleep study to rule out CO2 retention at night report is not available  He does not exercise and again remains overweight Blood pressure initially was 118/80 but later with a large cuff pressure was 135/90  Visit February 7, 2024: The patient has been diagnosed with obstructive sleep apnea with periods of hypoventilation with elevation of CO2 Unfortunately he has gained 7 pounds since the last visit rather than losing.  Overall he feels well without chest pain chest pressure shortness of breath.  He has been diagnosed with sleep apnea some form with some CO2 retention hypoventilation.  He is now on a trial of CPAP having difficulty using it.  He still has recurrent headaches which do respond to his present medication.  Visit June 5, 2024 Overall he is feeling well but does have some trouble with his BiPAP machine in terms of fluid coming out of the machine.  His headaches still occur but may be a little bit better he does apparently have some CO2 retention he remains on stable medication and he remains overweight and has not lost any weight.  He has had significant GI issues and has been extensively evaluated by Dr. Wei

## 2024-06-05 NOTE — END OF VISIT
[Time Spent: ___ minutes] : I have spent [unfilled] minutes of time on the encounter.
[Time Spent: ___ minutes] : I have spent [unfilled] minutes of time on the encounter.
23-Nov-2023 05:15

## 2024-06-05 NOTE — DISCUSSION/SUMMARY
[FreeTextEntry1] : 1.  I had a long discussion with him about the importance of weight reduction and he should see a nutritionist 2.  Continue present regimen of medication 3.  Routine follow-up with me again in 4 months and he should follow-up with sleep apnea [EKG obtained to assist in diagnosis and management of assessed problem(s)] : EKG obtained to assist in diagnosis and management of assessed problem(s)

## 2024-06-17 ENCOUNTER — RX RENEWAL (OUTPATIENT)
Age: 39
End: 2024-06-17

## 2024-06-17 ENCOUNTER — APPOINTMENT (OUTPATIENT)
Dept: UROLOGY | Facility: CLINIC | Age: 39
End: 2024-06-17

## 2024-06-17 RX ORDER — LOSARTAN POTASSIUM 25 MG/1
25 TABLET, FILM COATED ORAL
Qty: 90 | Refills: 0 | Status: ACTIVE | COMMUNITY
Start: 2023-08-16 | End: 1900-01-01

## 2024-06-17 RX ORDER — LOSARTAN POTASSIUM 50 MG/1
50 TABLET, FILM COATED ORAL DAILY
Qty: 90 | Refills: 0 | Status: ACTIVE | COMMUNITY
Start: 2023-07-11 | End: 1900-01-01

## 2024-06-20 ENCOUNTER — APPOINTMENT (OUTPATIENT)
Dept: ALLERGY | Facility: CLINIC | Age: 39
End: 2024-06-20
Payer: MEDICAID

## 2024-06-20 ENCOUNTER — NON-APPOINTMENT (OUTPATIENT)
Age: 39
End: 2024-06-20

## 2024-06-20 VITALS
RESPIRATION RATE: 14 BRPM | TEMPERATURE: 97.4 F | OXYGEN SATURATION: 98 % | DIASTOLIC BLOOD PRESSURE: 80 MMHG | HEART RATE: 86 BPM | SYSTOLIC BLOOD PRESSURE: 120 MMHG

## 2024-06-20 PROCEDURE — 99204 OFFICE O/P NEW MOD 45 MIN: CPT | Mod: 25

## 2024-06-20 PROCEDURE — 95004 PERQ TESTS W/ALRGNC XTRCS: CPT

## 2024-06-20 RX ORDER — MOMETASONE FUROATE 1 MG/G
0.1 CREAM TOPICAL DAILY
Qty: 1 | Refills: 0 | Status: ACTIVE | COMMUNITY
Start: 2024-06-20 | End: 1900-01-01

## 2024-06-20 RX ORDER — RIMEGEPANT SULFATE 75 MG/75MG
75 TABLET, ORALLY DISINTEGRATING ORAL
Refills: 0 | Status: ACTIVE | COMMUNITY

## 2024-06-20 NOTE — SOCIAL HISTORY
[House] : [unfilled] lives in a house  [Radiator/Baseboard] : heating provided by radiator(s)/baseboard(s) [Bedroom] :  in bedroom [Basement] :  in basement  [None] : none [Single] : single [FreeTextEntry1] : Student - [employer]  Lives with parents  [Living Area] : not in the living area [Smokers in Household] : there are no smokers in the home [de-identified] : split units

## 2024-06-20 NOTE — REVIEW OF SYSTEMS
[Heartburn] : heartburn [Headache] : headache [Nl] : Genitourinary [FreeTextEntry5] : hypertension  [FreeTextEntry6] : excess CO2 during sleep  [FreeTextEntry7] : proctitis

## 2024-06-20 NOTE — HISTORY OF PRESENT ILLNESS
[de-identified] : Patient reports a long history of facial redness and flaking of the skin - sometimes intermittent nasal congestion - sneezing - he had DNS repaired - turbinate reduction - tonsils removed - sinus infections - facial pressure - discolored nasal mucus production.   He had seen allergy MD - treated with IT - x few years with no change in symptoms - off treatment x 5 years.   He has a history of asthma - with exposure to strong odors he will wheezing with tightness - albuterol improves symptoms.   He has seen dermatologist and diagnosed with seborrhea - he feels garlic and pepper exacerbate his symptoms.    He also feels his chest worsens with these food exposure.

## 2024-06-20 NOTE — SOCIAL HISTORY
[House] : [unfilled] lives in a house  [Radiator/Baseboard] : heating provided by radiator(s)/baseboard(s) [Bedroom] :  in bedroom [Basement] :  in basement  [None] : none [Single] : single [FreeTextEntry1] : Student - [employer]  Lives with parents  [Living Area] : not in the living area [Smokers in Household] : there are no smokers in the home [de-identified] : split units

## 2024-06-20 NOTE — HISTORY OF PRESENT ILLNESS
[de-identified] : Patient reports a long history of facial redness and flaking of the skin - sometimes intermittent nasal congestion - sneezing - he had DNS repaired - turbinate reduction - tonsils removed - sinus infections - facial pressure - discolored nasal mucus production.   He had seen allergy MD - treated with IT - x few years with no change in symptoms - off treatment x 5 years.   He has a history of asthma - with exposure to strong odors he will wheezing with tightness - albuterol improves symptoms.   He has seen dermatologist and diagnosed with seborrhea - he feels garlic and pepper exacerbate his symptoms.    He also feels his chest worsens with these food exposure.

## 2024-06-20 NOTE — PHYSICAL EXAM
[Alert] : alert [Well Nourished] : well nourished [Healthy Appearance] : healthy appearance [No Acute Distress] : no acute distress [Well Developed] : well developed [Normal Voice/Communication] : normal voice communication [Normal Nasal Mucosa] : the nasal mucosa was normal [Normal Lips/Tongue] : the lips and tongue were normal [No Neck Mass] : no neck mass was observed [Normal Rate and Effort] : normal respiratory rhythm and effort [No Crackles] : no crackles [No Retractions] : no retractions [Wheezing] : no wheezing was heard [Normal Rate] : heart rate was normal  [Normal S1, S2] : normal S1 and S2 [Regular Rhythm] : with a regular rhythm [Normal Cervical Lymph Nodes] : cervical [Patches] : ~M patches present [No Cyanosis] : no cyanosis [Normal Mood] : mood was normal [Judgment and Insight Age Appropriate] : judgement and insight is age appropriate [Normal Affect] : affect was normal [de-identified] : facial scaling and erythema - involving cheeks and forehead  [de-identified] : see above

## 2024-06-20 NOTE — PHYSICAL EXAM
[Alert] : alert [Well Nourished] : well nourished [Healthy Appearance] : healthy appearance [No Acute Distress] : no acute distress [Well Developed] : well developed [Normal Voice/Communication] : normal voice communication [Normal Nasal Mucosa] : the nasal mucosa was normal [Normal Lips/Tongue] : the lips and tongue were normal [No Neck Mass] : no neck mass was observed [Normal Rate and Effort] : normal respiratory rhythm and effort [No Crackles] : no crackles [No Retractions] : no retractions [Wheezing] : no wheezing was heard [Normal Rate] : heart rate was normal  [Normal S1, S2] : normal S1 and S2 [Regular Rhythm] : with a regular rhythm [Normal Cervical Lymph Nodes] : cervical [Patches] : ~M patches present [No Cyanosis] : no cyanosis [Normal Mood] : mood was normal [Normal Affect] : affect was normal [Judgment and Insight Age Appropriate] : judgement and insight is age appropriate [de-identified] : see above  [de-identified] : facial scaling and erythema - involving cheeks and forehead

## 2024-06-20 NOTE — ASSESSMENT
[FreeTextEntry1] : Seborrheic dermatitis - no improvement with antifungal medications:  Elocon cream QD x 2 weeks  Stop antifungal cream since he has had no improvement  No evidence of food allergies   Mild intermittent asthma:  Albuterol 2 puffs QID prn  RV environmental skin testing

## 2024-06-20 NOTE — REVIEW OF SYSTEMS
[Heartburn] : heartburn [Headache] : headache [Nl] : Genitourinary [FreeTextEntry6] : excess CO2 during sleep  [FreeTextEntry5] : hypertension  [FreeTextEntry7] : proctitis

## 2024-06-24 ENCOUNTER — APPOINTMENT (OUTPATIENT)
Dept: UROLOGY | Facility: CLINIC | Age: 39
End: 2024-06-24

## 2024-07-25 ENCOUNTER — APPOINTMENT (OUTPATIENT)
Dept: ALLERGY | Facility: CLINIC | Age: 39
End: 2024-07-25
Payer: MEDICAID

## 2024-07-25 VITALS
DIASTOLIC BLOOD PRESSURE: 80 MMHG | SYSTOLIC BLOOD PRESSURE: 125 MMHG | OXYGEN SATURATION: 98 % | RESPIRATION RATE: 14 BRPM | TEMPERATURE: 97.7 F | HEART RATE: 96 BPM

## 2024-07-25 PROCEDURE — 95004 PERQ TESTS W/ALRGNC XTRCS: CPT

## 2024-07-25 PROCEDURE — 99213 OFFICE O/P EST LOW 20 MIN: CPT | Mod: 25

## 2024-07-25 NOTE — ASSESSMENT
[FreeTextEntry1] : Seborrheic dermatitis - no improvement with antifungal medications:  Elocon cream QD x 1 month then prn  Stop antifungal cream since he has had no improvement  No evidence of food allergies  Mild spring allergies  Mild intermittent asthma:  Albuterol 2 puffs QID prn

## 2024-07-25 NOTE — IMPRESSION
[_____] : grasses ([unfilled]) [Allergy Testing Dust Mite] : dust mites [Allergy Testing Cockroach] : cockroach [Allergy Testing Cat] : cat [Allergy Testing Dog] : dog [] : molds [Allergy Testing Trees] : trees [Allergy Testing Weeds] : weeds

## 2024-07-25 NOTE — PHYSICAL EXAM
[Alert] : alert [Well Nourished] : well nourished [Healthy Appearance] : healthy appearance [No Acute Distress] : no acute distress [Well Developed] : well developed [Normal Voice/Communication] : normal voice communication [Normal Nasal Mucosa] : the nasal mucosa was normal [Normal Lips/Tongue] : the lips and tongue were normal [No Neck Mass] : no neck mass was observed [Normal Rate and Effort] : normal respiratory rhythm and effort [No Crackles] : no crackles [No Retractions] : no retractions [Normal Rate] : heart rate was normal  [Normal S1, S2] : normal S1 and S2 [Regular Rhythm] : with a regular rhythm [Normal Cervical Lymph Nodes] : cervical [Patches] : ~M patches present [No Cyanosis] : no cyanosis [Normal Mood] : mood was normal [Normal Affect] : affect was normal [Judgment and Insight Age Appropriate] : judgement and insight is age appropriate [Wheezing] : no wheezing was heard [de-identified] : facial scaling and erythema - involving cheeks and forehead  [de-identified] : see above

## 2024-08-08 ENCOUNTER — APPOINTMENT (OUTPATIENT)
Dept: UROLOGY | Facility: CLINIC | Age: 39
End: 2024-08-08

## 2024-08-08 PROBLEM — Z84.1 FAMILY HISTORY OF KIDNEY STONE: Status: ACTIVE | Noted: 2024-08-08

## 2024-08-08 PROBLEM — Z84.1 FAMILY HISTORY OF KIDNEY STONES: Status: ACTIVE | Noted: 2024-08-08

## 2024-08-08 PROBLEM — Z80.51 FAMILY HISTORY OF MALIGNANT NEOPLASM OF KIDNEY: Status: ACTIVE | Noted: 2024-08-08

## 2024-08-08 PROBLEM — R35.0 URINARY FREQUENCY: Status: ACTIVE | Noted: 2024-08-08

## 2024-08-08 PROCEDURE — 99204 OFFICE O/P NEW MOD 45 MIN: CPT | Mod: 25

## 2024-08-08 PROCEDURE — 51798 US URINE CAPACITY MEASURE: CPT

## 2024-08-08 NOTE — PHYSICAL EXAM
[Normal Appearance] : normal appearance [Well Groomed] : well groomed [General Appearance - In No Acute Distress] : no acute distress [Edema] : no peripheral edema [Respiration, Rhythm And Depth] : normal respiratory rhythm and effort [Exaggerated Use Of Accessory Muscles For Inspiration] : no accessory muscle use [Abdomen Soft] : soft [Abdomen Tenderness] : non-tender [Costovertebral Angle Tenderness] : no ~M costovertebral angle tenderness [Urethral Meatus] : meatus normal [Penis Abnormality] : normal circumcised penis [Urinary Bladder Findings] : the bladder was normal on palpation [Testes Tenderness] : no tenderness of the testes [Testes Mass (___cm)] : there were no testicular masses [Normal Station and Gait] : the gait and station were normal for the patient's age [] : no rash [No Focal Deficits] : no focal deficits [Oriented To Time, Place, And Person] : oriented to person, place, and time [Affect] : the affect was normal [Mood] : the mood was normal

## 2024-08-08 NOTE — ASSESSMENT
[FreeTextEntry1] : Bladder scan: 12 cc PVR  Daytime but no nighttime sx, frequency, urgency Able to empty well. Sig caffeine intake which may be conditional. Exam o/w unremarkable today; young for VEDA prostate. reports PSA wnl in last 12/2024  No meds recommended at this time diet mod recommended, to cut caffeine, monitor fluid  FH stones- will rec renal/bladder US  can ttm in 4-6 weeks to reassess u/a with micro, and culture today

## 2024-08-08 NOTE — HISTORY OF PRESENT ILLNESS
[Urinary Urgency] : urinary urgency [Urinary Frequency] : urinary frequency [FreeTextEntry1] : IRENE VALE is a 38 year M who presents for urinary frequency over the past year. Drinks sig coffee, up to approx 3 cups per day. Hasn't noticed any sig changes with reducing coffee intake, but experiences less frequency with 'cold brew'. Nocturia is 0-1/night. Daytime, can be as much as every 30 min. No dysuria. No hematuria. (Prior u/a wnl).  No stones, no flank or abdominal pain.  PMH: GERD, migraines, 'heartburn', htn, proctitis PSH: h/o colonoscopy/upper endoscopy, s/p deviated septum FH: father with stone, mother with renal cell cancer Meds: nyrtec, sumatriptan, amobig, flonase when needed. Lialda (proctitis), losartan, omeprazole SH: sig caffeine intake, kosher diet and avoids migraine triggers Allergies: NKDA

## 2024-08-22 ENCOUNTER — NON-APPOINTMENT (OUTPATIENT)
Age: 39
End: 2024-08-22

## 2024-09-17 ENCOUNTER — APPOINTMENT (OUTPATIENT)
Dept: UROLOGY | Facility: CLINIC | Age: 39
End: 2024-09-17
Payer: MEDICAID

## 2024-09-17 DIAGNOSIS — R39.15 URGENCY OF URINATION: ICD-10-CM

## 2024-09-17 DIAGNOSIS — R35.0 FREQUENCY OF MICTURITION: ICD-10-CM

## 2024-09-17 PROCEDURE — 99442: CPT

## 2024-09-17 NOTE — ASSESSMENT
[FreeTextEntry1] : d/w pt sx manageable with reduced caffeine and fluid intake  options for further eval with cysto and urodynamics in office will consider, and f/u in 3 months with ttm can do f/u PSA with us or PCP, and provide information to us

## 2024-09-17 NOTE — HISTORY OF PRESENT ILLNESS
[Other Location: e.g. School (Enter Location, City,State)___] : at [unfilled], at the time of the visit. [Other Location: e.g. Home (Enter Location, City,State)___] : at [unfilled] [Verbal consent obtained from patient] : the patient, [unfilled] [FreeTextEntry1] : The patient-doctor relationship has been established in audio HIPAA compliant communication. The patient's identity has been confirmed. The patient was previously emailed a copy of the telemedicine consent. They have had a chance to review and has now given verbal consent and has requested care to be assessed and treated via telemedicine. They understand there may be limitations in this process, and that they may need further followup care in the office and/or hospital settings.  Verbal consent given on Sep 17 2024  1:00PM  IRENE VALE is a 38 year M who presents for f/u of renal US on eval for urinary frequency over the past year. Drinks sig coffee, up to approx 3 cups per day. Hasn't noticed any sig changes with reducing coffee intake, but experiences less frequency with 'cold brew'. Nocturia is 0-1/night. Daytime, can be as much as every 30 min. No dysuria. No hematuria. (Prior u/a wnl).  No stones, no flank or abdominal pain.  9/17/2024   The patient denies fevers, chills, nausea and/or vomiting, and no unexplained weight loss.  All pertinent parts of the patient PFSH (past medical, family, and social histories), laboratory, radiological studies and available physician notes were reviewed prior to starting the face-to-face portion of the telemedicine visit. Questionnaire results, where appropriate, were discussed with the patient.

## 2024-11-13 ENCOUNTER — APPOINTMENT (OUTPATIENT)
Dept: CARDIOLOGY | Facility: CLINIC | Age: 39
End: 2024-11-13
Payer: MEDICAID

## 2024-11-13 ENCOUNTER — NON-APPOINTMENT (OUTPATIENT)
Age: 39
End: 2024-11-13

## 2024-11-13 VITALS
WEIGHT: 263 LBS | HEART RATE: 89 BPM | OXYGEN SATURATION: 96 % | SYSTOLIC BLOOD PRESSURE: 120 MMHG | HEIGHT: 70 IN | BODY MASS INDEX: 37.65 KG/M2 | DIASTOLIC BLOOD PRESSURE: 76 MMHG

## 2024-11-13 DIAGNOSIS — I10 ESSENTIAL (PRIMARY) HYPERTENSION: ICD-10-CM

## 2024-11-13 DIAGNOSIS — E66.2 OBESITY, CLASS 2: ICD-10-CM

## 2024-11-13 DIAGNOSIS — K21.9 GASTRO-ESOPHAGEAL REFLUX DISEASE W/OUT ESOPHAGITIS: ICD-10-CM

## 2024-11-13 DIAGNOSIS — E66.812 OBESITY, CLASS 2: ICD-10-CM

## 2024-11-13 DIAGNOSIS — G43.909 MIGRAINE, UNSPECIFIED, NOT INTRACTABLE, W/OUT STATUS MIGRAINOSUS: ICD-10-CM

## 2024-11-13 PROCEDURE — 93000 ELECTROCARDIOGRAM COMPLETE: CPT

## 2024-11-13 PROCEDURE — 99203 OFFICE O/P NEW LOW 30 MIN: CPT

## 2024-11-13 PROCEDURE — G2211 COMPLEX E/M VISIT ADD ON: CPT | Mod: NC

## 2024-11-16 ENCOUNTER — RX RENEWAL (OUTPATIENT)
Age: 39
End: 2024-11-16

## 2025-03-03 NOTE — ASU PATIENT PROFILE, ADULT - MEDICATIONS TO TAKE
Lab Results   Component Value Date    HGBA1C 6.5 03/03/2025     - At goal. Continue metformin 1000 mg BID & lantus 20 units daily.  Orders:    POCT hemoglobin A1c    
  Lab Results   Component Value Date    HGBA1C 6.5 03/03/2025     At goal. Continue metformin 1000 mg daily.   Orders:    POCT hemoglobin A1c    
Controlled, continue with Amlodipine and Losartan       
Lab Results   Component Value Date    CHOLESTEROL 148 05/23/2023    CHOLESTEROL 104 03/18/2022    CHOLESTEROL 122 10/05/2021     Lab Results   Component Value Date    HDL 46 05/23/2023    HDL 47 03/18/2022    HDL 46 10/05/2021     Lab Results   Component Value Date    TRIG 121 05/23/2023    TRIG 53 03/18/2022    TRIG 83 10/05/2021     Lab Results   Component Value Date    NONHDLC 102 05/23/2023    NONHDLC 91 02/09/2021    NONHDLC 59 12/20/2019     Lab Results   Component Value Date    LDLCALC 78 05/23/2023       Continue with Atorvastatin.       
Lab Results   Component Value Date    CHOLESTEROL 148 05/23/2023    CHOLESTEROL 104 03/18/2022    CHOLESTEROL 122 10/05/2021     Lab Results   Component Value Date    HDL 46 05/23/2023    HDL 47 03/18/2022    HDL 46 10/05/2021     Lab Results   Component Value Date    TRIG 121 05/23/2023    TRIG 53 03/18/2022    TRIG 83 10/05/2021     Lab Results   Component Value Date    NONHDLC 102 05/23/2023    NONHDLC 91 02/09/2021    NONHDLC 59 12/20/2019     Lab Results   Component Value Date    LDLCALC 78 05/23/2023     - At goal. Continue Lipitor 40 mg daily.        
per PAST

## 2025-05-14 ENCOUNTER — APPOINTMENT (OUTPATIENT)
Dept: CARDIOLOGY | Facility: CLINIC | Age: 40
End: 2025-05-14
Payer: MEDICAID

## 2025-05-14 ENCOUNTER — NON-APPOINTMENT (OUTPATIENT)
Age: 40
End: 2025-05-14

## 2025-05-14 VITALS
BODY MASS INDEX: 38.37 KG/M2 | WEIGHT: 268 LBS | OXYGEN SATURATION: 97 % | SYSTOLIC BLOOD PRESSURE: 140 MMHG | HEIGHT: 70 IN | HEART RATE: 78 BPM | DIASTOLIC BLOOD PRESSURE: 90 MMHG

## 2025-05-14 DIAGNOSIS — G47.33 OBSTRUCTIVE SLEEP APNEA (ADULT) (PEDIATRIC): ICD-10-CM

## 2025-05-14 DIAGNOSIS — E66.2 OBESITY, CLASS 2: ICD-10-CM

## 2025-05-14 DIAGNOSIS — G43.909 MIGRAINE, UNSPECIFIED, NOT INTRACTABLE, W/OUT STATUS MIGRAINOSUS: ICD-10-CM

## 2025-05-14 DIAGNOSIS — E66.812 OBESITY, CLASS 2: ICD-10-CM

## 2025-05-14 DIAGNOSIS — K21.9 GASTRO-ESOPHAGEAL REFLUX DISEASE W/OUT ESOPHAGITIS: ICD-10-CM

## 2025-05-14 DIAGNOSIS — I10 ESSENTIAL (PRIMARY) HYPERTENSION: ICD-10-CM

## 2025-05-14 PROCEDURE — 99213 OFFICE O/P EST LOW 20 MIN: CPT | Mod: 25

## 2025-05-14 PROCEDURE — 93000 ELECTROCARDIOGRAM COMPLETE: CPT

## 2025-06-10 ENCOUNTER — RX RENEWAL (OUTPATIENT)
Age: 40
End: 2025-06-10

## 2025-06-11 ENCOUNTER — APPOINTMENT (OUTPATIENT)
Dept: CARDIOLOGY | Facility: CLINIC | Age: 40
End: 2025-06-11

## 2025-07-10 ENCOUNTER — APPOINTMENT (OUTPATIENT)
Dept: CARDIOLOGY | Facility: CLINIC | Age: 40
End: 2025-07-10

## 2025-07-10 PROCEDURE — 97802 MEDICAL NUTRITION INDIV IN: CPT | Mod: 95

## 2025-09-02 ENCOUNTER — RX RENEWAL (OUTPATIENT)
Age: 40
End: 2025-09-02

## 2025-09-03 ENCOUNTER — APPOINTMENT (OUTPATIENT)
Dept: CARDIOLOGY | Facility: CLINIC | Age: 40
End: 2025-09-03
Payer: MEDICAID

## 2025-09-03 VITALS
OXYGEN SATURATION: 97 % | HEART RATE: 93 BPM | SYSTOLIC BLOOD PRESSURE: 130 MMHG | DIASTOLIC BLOOD PRESSURE: 86 MMHG | WEIGHT: 277 LBS | BODY MASS INDEX: 39.75 KG/M2

## 2025-09-03 DIAGNOSIS — K21.9 GASTRO-ESOPHAGEAL REFLUX DISEASE W/OUT ESOPHAGITIS: ICD-10-CM

## 2025-09-03 DIAGNOSIS — G47.33 OBSTRUCTIVE SLEEP APNEA (ADULT) (PEDIATRIC): ICD-10-CM

## 2025-09-03 DIAGNOSIS — G43.909 MIGRAINE, UNSPECIFIED, NOT INTRACTABLE, W/OUT STATUS MIGRAINOSUS: ICD-10-CM

## 2025-09-03 DIAGNOSIS — G47.9 SLEEP DISORDER, UNSPECIFIED: ICD-10-CM

## 2025-09-03 DIAGNOSIS — I10 ESSENTIAL (PRIMARY) HYPERTENSION: ICD-10-CM

## 2025-09-03 DIAGNOSIS — K52.9 NONINFECTIVE GASTROENTERITIS AND COLITIS, UNSPECIFIED: ICD-10-CM

## 2025-09-03 PROCEDURE — G2211 COMPLEX E/M VISIT ADD ON: CPT | Mod: NC

## 2025-09-03 PROCEDURE — 99204 OFFICE O/P NEW MOD 45 MIN: CPT
